# Patient Record
Sex: MALE | Race: WHITE | NOT HISPANIC OR LATINO | Employment: OTHER | URBAN - METROPOLITAN AREA
[De-identification: names, ages, dates, MRNs, and addresses within clinical notes are randomized per-mention and may not be internally consistent; named-entity substitution may affect disease eponyms.]

---

## 2023-12-01 DIAGNOSIS — Z01.818 PREOPERATIVE CLEARANCE: ICD-10-CM

## 2023-12-06 ENCOUNTER — TELEMEDICINE CLINICAL SUPPORT (OUTPATIENT)
Dept: UROLOGY | Facility: CLINIC | Age: 38
End: 2023-12-06
Payer: COMMERCIAL

## 2023-12-06 PROCEDURE — 90791 PSYCH DIAGNOSTIC EVALUATION: CPT | Performed by: PSYCHOLOGIST

## 2023-12-18 DIAGNOSIS — Z01.818 PREOPERATIVE CLEARANCE: ICD-10-CM

## 2023-12-21 DIAGNOSIS — Z41.9 SURGERY, ELECTIVE: ICD-10-CM

## 2023-12-21 RX ORDER — TESTOSTERONE CYPIONATE 200 MG/ML
120 INJECTION, SOLUTION INTRAMUSCULAR
COMMUNITY
Start: 2023-04-27

## 2023-12-21 RX ORDER — CHLORHEXIDINE GLUCONATE 40 MG/ML
SOLUTION TOPICAL SEE ADMIN INSTRUCTIONS
Qty: 118 ML | Refills: 0 | Status: SHIPPED | OUTPATIENT
Start: 2023-12-21 | End: 2023-12-29 | Stop reason: HOSPADM

## 2023-12-21 RX ORDER — METHADONE HYDROCHLORIDE 10 MG/ML
97 CONCENTRATE ORAL DAILY
COMMUNITY

## 2023-12-27 NOTE — PREPROCEDURE INSTRUCTIONS
Current Medications   Medication Instructions    chlorhexidine (Hibiclens) 4 % external liquid Use as directed    methadone (Dolophine) 10 mg/mL solution Continue until night before surgery    testosterone cypionate (Depo-Testosterone) 200 mg/mL injection Use as directed      NPO Instructions:  Additional Instructions:     Enter through main entrance of [facility] hospital building, located at [address] [facility]. Proceed to registration, located in the back right hand corner. You will need your ID and insurance card for registration. Please ensure you have a responsible adult to drive you home.     Shower the morning of or night before your procedure. After you shower avoid lotions, powders, deodorants or anything applied to the skin. If you wear contacts or glasses, wear the glasses. If you do not have glasses, please bring a case for your contacts. You may wear hearing aids and dentures, bring a case for them or we will provide one. Make sure you wear something loose and comfortable. Keep in mind your surgery type and wear something that will accommodate incisions or bandages. Please remove all jewelry.     Nothing to eat or drink after midnight (including coffee, tea, gum etc). You may take medications discussed during phone call with a small sip of water.    For further questions Demarco EL can be contacted at [phone] between 7AM-3PM.

## 2023-12-28 ENCOUNTER — HOSPITAL ENCOUNTER (INPATIENT)
Facility: HOSPITAL | Age: 38
LOS: 1 days | Discharge: HOME | DRG: 876 | End: 2023-12-29
Attending: UROLOGY | Admitting: UROLOGY
Payer: COMMERCIAL

## 2023-12-28 ENCOUNTER — ANESTHESIA (OUTPATIENT)
Dept: OPERATING ROOM | Facility: HOSPITAL | Age: 38
DRG: 876 | End: 2023-12-28
Payer: COMMERCIAL

## 2023-12-28 ENCOUNTER — ANESTHESIA EVENT (OUTPATIENT)
Dept: OPERATING ROOM | Facility: HOSPITAL | Age: 38
DRG: 876 | End: 2023-12-28
Payer: COMMERCIAL

## 2023-12-28 DIAGNOSIS — F64.9 GENDER DYSPHORIA: Primary | ICD-10-CM

## 2023-12-28 PROBLEM — F11.90 OPIOID USE DISORDER: Status: ACTIVE | Noted: 2023-12-28

## 2023-12-28 LAB
ABO GROUP (TYPE) IN BLOOD: NORMAL
ABO GROUP (TYPE) IN BLOOD: NORMAL
ANION GAP SERPL CALC-SCNC: 12 MMOL/L (ref 10–20)
ANTIBODY SCREEN: NORMAL
BUN SERPL-MCNC: 7 MG/DL (ref 6–23)
CALCIUM SERPL-MCNC: 9.2 MG/DL (ref 8.6–10.3)
CHLORIDE SERPL-SCNC: 101 MMOL/L (ref 98–107)
CO2 SERPL-SCNC: 29 MMOL/L (ref 21–32)
CREAT SERPL-MCNC: 1.05 MG/DL (ref 0.5–1.3)
ERYTHROCYTE [DISTWIDTH] IN BLOOD BY AUTOMATED COUNT: 13.1 % (ref 11.5–14.5)
GFR SERPL CREATININE-BSD FRML MDRD: 70 ML/MIN/1.73M*2
GLUCOSE SERPL-MCNC: 67 MG/DL (ref 74–99)
HCT VFR BLD AUTO: 48.2 % (ref 36–52)
HGB BLD-MCNC: 16.4 G/DL (ref 12–17.5)
MCH RBC QN AUTO: 30.4 PG (ref 26–34)
MCHC RBC AUTO-ENTMCNC: 34 G/DL (ref 32–36)
MCV RBC AUTO: 89 FL (ref 80–100)
NRBC BLD-RTO: 0 /100 WBCS (ref 0–0)
PLATELET # BLD AUTO: 220 X10*3/UL (ref 150–450)
POTASSIUM SERPL-SCNC: 3.6 MMOL/L (ref 3.5–5.3)
RBC # BLD AUTO: 5.39 X10*6/UL (ref 4–5.9)
RH FACTOR (ANTIGEN D): NORMAL
RH FACTOR (ANTIGEN D): NORMAL
SODIUM SERPL-SCNC: 138 MMOL/L (ref 136–145)
WBC # BLD AUTO: 10.1 X10*3/UL (ref 4.4–11.3)

## 2023-12-28 PROCEDURE — 2500000004 HC RX 250 GENERAL PHARMACY W/ HCPCS (ALT 636 FOR OP/ED): Performed by: NURSE PRACTITIONER

## 2023-12-28 PROCEDURE — A53410 PR RECONSTRUC ANT MALE URETHRA

## 2023-12-28 PROCEDURE — 53410 RECONSTRUCTION OF URETHRA: CPT | Performed by: UROLOGY

## 2023-12-28 PROCEDURE — 2500000002 HC RX 250 W HCPCS SELF ADMINISTERED DRUGS (ALT 637 FOR MEDICARE OP, ALT 636 FOR OP/ED): Performed by: STUDENT IN AN ORGANIZED HEALTH CARE EDUCATION/TRAINING PROGRAM

## 2023-12-28 PROCEDURE — 3600000003 HC OR TIME - INITIAL BASE CHARGE - PROCEDURE LEVEL THREE: Performed by: UROLOGY

## 2023-12-28 PROCEDURE — 2500000004 HC RX 250 GENERAL PHARMACY W/ HCPCS (ALT 636 FOR OP/ED): Performed by: STUDENT IN AN ORGANIZED HEALTH CARE EDUCATION/TRAINING PROGRAM

## 2023-12-28 PROCEDURE — 15734 MUSCLE-SKIN GRAFT TRUNK: CPT | Performed by: UROLOGY

## 2023-12-28 PROCEDURE — 2500000005 HC RX 250 GENERAL PHARMACY W/O HCPCS: Performed by: ANESTHESIOLOGIST ASSISTANT

## 2023-12-28 PROCEDURE — 2720000007 HC OR 272 NO HCPCS: Performed by: UROLOGY

## 2023-12-28 PROCEDURE — A53410 PR RECONSTRUC ANT MALE URETHRA: Performed by: ANESTHESIOLOGY

## 2023-12-28 PROCEDURE — 0W4N071 CREATION OF PENIS IN FEMALE PERINEUM WITH AUTOLOGOUS TISSUE SUBSTITUTE, OPEN APPROACH: ICD-10-PCS | Performed by: UROLOGY

## 2023-12-28 PROCEDURE — 3700000001 HC GENERAL ANESTHESIA TIME - INITIAL BASE CHARGE: Performed by: UROLOGY

## 2023-12-28 PROCEDURE — S0109 METHADONE ORAL 5MG: HCPCS | Performed by: STUDENT IN AN ORGANIZED HEALTH CARE EDUCATION/TRAINING PROGRAM

## 2023-12-28 PROCEDURE — 7100000001 HC RECOVERY ROOM TIME - INITIAL BASE CHARGE: Performed by: UROLOGY

## 2023-12-28 PROCEDURE — 7100000002 HC RECOVERY ROOM TIME - EACH INCREMENTAL 1 MINUTE: Performed by: UROLOGY

## 2023-12-28 PROCEDURE — 15740 ISLAND PEDICLE FLAP GRAFT: CPT | Performed by: UROLOGY

## 2023-12-28 PROCEDURE — C1894 INTRO/SHEATH, NON-LASER: HCPCS | Performed by: UROLOGY

## 2023-12-28 PROCEDURE — 14301 TIS TRNFR ANY 30.1-60 SQ CM: CPT | Performed by: UROLOGY

## 2023-12-28 PROCEDURE — 2500000004 HC RX 250 GENERAL PHARMACY W/ HCPCS (ALT 636 FOR OP/ED)

## 2023-12-28 PROCEDURE — 2500000005 HC RX 250 GENERAL PHARMACY W/O HCPCS

## 2023-12-28 PROCEDURE — 2500000004 HC RX 250 GENERAL PHARMACY W/ HCPCS (ALT 636 FOR OP/ED): Performed by: ANESTHESIOLOGY

## 2023-12-28 PROCEDURE — 2500000001 HC RX 250 WO HCPCS SELF ADMINISTERED DRUGS (ALT 637 FOR MEDICARE OP): Performed by: STUDENT IN AN ORGANIZED HEALTH CARE EDUCATION/TRAINING PROGRAM

## 2023-12-28 PROCEDURE — 14302 TIS TRNFR ADDL 30 SQ CM: CPT | Performed by: UROLOGY

## 2023-12-28 PROCEDURE — 96372 THER/PROPH/DIAG INJ SC/IM: CPT | Performed by: UROLOGY

## 2023-12-28 PROCEDURE — 85027 COMPLETE CBC AUTOMATED: CPT | Performed by: NURSE PRACTITIONER

## 2023-12-28 PROCEDURE — 3700000002 HC GENERAL ANESTHESIA TIME - EACH INCREMENTAL 1 MINUTE: Performed by: UROLOGY

## 2023-12-28 PROCEDURE — 86901 BLOOD TYPING SEROLOGIC RH(D): CPT | Performed by: UROLOGY

## 2023-12-28 PROCEDURE — 57110 VAGNC COMPL RMVL VAG WALL: CPT | Performed by: UROLOGY

## 2023-12-28 PROCEDURE — 96372 THER/PROPH/DIAG INJ SC/IM: CPT | Performed by: STUDENT IN AN ORGANIZED HEALTH CARE EDUCATION/TRAINING PROGRAM

## 2023-12-28 PROCEDURE — 2500000004 HC RX 250 GENERAL PHARMACY W/ HCPCS (ALT 636 FOR OP/ED): Performed by: ANESTHESIOLOGIST ASSISTANT

## 2023-12-28 PROCEDURE — 55180 REVISION OF SCROTUM: CPT | Performed by: UROLOGY

## 2023-12-28 PROCEDURE — 2500000005 HC RX 250 GENERAL PHARMACY W/O HCPCS: Performed by: UROLOGY

## 2023-12-28 PROCEDURE — 96372 THER/PROPH/DIAG INJ SC/IM: CPT | Performed by: NURSE PRACTITIONER

## 2023-12-28 PROCEDURE — 3600000008 HC OR TIME - EACH INCREMENTAL 1 MINUTE - PROCEDURE LEVEL THREE: Performed by: UROLOGY

## 2023-12-28 PROCEDURE — 80048 BASIC METABOLIC PNL TOTAL CA: CPT | Performed by: NURSE PRACTITIONER

## 2023-12-28 PROCEDURE — 36415 COLL VENOUS BLD VENIPUNCTURE: CPT | Performed by: NURSE PRACTITIONER

## 2023-12-28 PROCEDURE — 1100000001 HC PRIVATE ROOM DAILY

## 2023-12-28 RX ORDER — MIDAZOLAM HYDROCHLORIDE 1 MG/ML
INJECTION, SOLUTION INTRAMUSCULAR; INTRAVENOUS AS NEEDED
Status: DISCONTINUED | OUTPATIENT
Start: 2023-12-28 | End: 2023-12-28

## 2023-12-28 RX ORDER — MIDAZOLAM HYDROCHLORIDE 1 MG/ML
1 INJECTION, SOLUTION INTRAMUSCULAR; INTRAVENOUS ONCE AS NEEDED
Status: DISCONTINUED | OUTPATIENT
Start: 2023-12-28 | End: 2023-12-28 | Stop reason: HOSPADM

## 2023-12-28 RX ORDER — SODIUM CHLORIDE, SODIUM LACTATE, POTASSIUM CHLORIDE, CALCIUM CHLORIDE 600; 310; 30; 20 MG/100ML; MG/100ML; MG/100ML; MG/100ML
100 INJECTION, SOLUTION INTRAVENOUS CONTINUOUS
Status: DISCONTINUED | OUTPATIENT
Start: 2023-12-28 | End: 2023-12-28

## 2023-12-28 RX ORDER — CEFAZOLIN SODIUM 2 G/100ML
INJECTION, SOLUTION INTRAVENOUS AS NEEDED
Status: DISCONTINUED | OUTPATIENT
Start: 2023-12-28 | End: 2023-12-28

## 2023-12-28 RX ORDER — DIPHENHYDRAMINE HYDROCHLORIDE 50 MG/ML
25 INJECTION INTRAMUSCULAR; INTRAVENOUS ONCE AS NEEDED
Status: DISCONTINUED | OUTPATIENT
Start: 2023-12-28 | End: 2023-12-28 | Stop reason: HOSPADM

## 2023-12-28 RX ORDER — FENTANYL CITRATE 50 UG/ML
INJECTION, SOLUTION INTRAMUSCULAR; INTRAVENOUS AS NEEDED
Status: DISCONTINUED | OUTPATIENT
Start: 2023-12-28 | End: 2023-12-28

## 2023-12-28 RX ORDER — OXYBUTYNIN CHLORIDE 5 MG/1
5 TABLET ORAL DAILY PRN
Status: DISCONTINUED | OUTPATIENT
Start: 2023-12-28 | End: 2023-12-29 | Stop reason: HOSPADM

## 2023-12-28 RX ORDER — CEFAZOLIN SODIUM 2 G/100ML
2 INJECTION, SOLUTION INTRAVENOUS ONCE
Status: DISCONTINUED | OUTPATIENT
Start: 2023-12-28 | End: 2023-12-28

## 2023-12-28 RX ORDER — METHADONE HYDROCHLORIDE 5 MG/5ML
96 SOLUTION ORAL DAILY
Status: DISCONTINUED | OUTPATIENT
Start: 2023-12-28 | End: 2023-12-29 | Stop reason: HOSPADM

## 2023-12-28 RX ORDER — HEPARIN SODIUM 5000 [USP'U]/ML
5000 INJECTION, SOLUTION INTRAVENOUS; SUBCUTANEOUS ONCE
Status: COMPLETED | OUTPATIENT
Start: 2023-12-28 | End: 2023-12-28

## 2023-12-28 RX ORDER — SODIUM CHLORIDE 9 MG/ML
100 INJECTION, SOLUTION INTRAVENOUS CONTINUOUS
Status: DISCONTINUED | OUTPATIENT
Start: 2023-12-28 | End: 2023-12-29 | Stop reason: HOSPADM

## 2023-12-28 RX ORDER — ALBUTEROL SULFATE 0.83 MG/ML
2.5 SOLUTION RESPIRATORY (INHALATION) ONCE AS NEEDED
Status: DISCONTINUED | OUTPATIENT
Start: 2023-12-28 | End: 2023-12-28 | Stop reason: HOSPADM

## 2023-12-28 RX ORDER — ONDANSETRON 4 MG/1
4 TABLET, FILM COATED ORAL EVERY 8 HOURS PRN
Status: DISCONTINUED | OUTPATIENT
Start: 2023-12-28 | End: 2023-12-29 | Stop reason: HOSPADM

## 2023-12-28 RX ORDER — GABAPENTIN 300 MG/1
600 CAPSULE ORAL ONCE
Status: DISCONTINUED | OUTPATIENT
Start: 2023-12-28 | End: 2023-12-28 | Stop reason: HOSPADM

## 2023-12-28 RX ORDER — HYDROMORPHONE HYDROCHLORIDE 1 MG/ML
1 INJECTION, SOLUTION INTRAMUSCULAR; INTRAVENOUS; SUBCUTANEOUS EVERY 5 MIN PRN
Status: DISCONTINUED | OUTPATIENT
Start: 2023-12-28 | End: 2023-12-28 | Stop reason: HOSPADM

## 2023-12-28 RX ORDER — MEPERIDINE HYDROCHLORIDE 25 MG/ML
12.5 INJECTION INTRAMUSCULAR; INTRAVENOUS; SUBCUTANEOUS EVERY 10 MIN PRN
Status: DISCONTINUED | OUTPATIENT
Start: 2023-12-28 | End: 2023-12-28 | Stop reason: HOSPADM

## 2023-12-28 RX ORDER — NORETHINDRONE AND ETHINYL ESTRADIOL 0.5-0.035
KIT ORAL AS NEEDED
Status: DISCONTINUED | OUTPATIENT
Start: 2023-12-28 | End: 2023-12-28

## 2023-12-28 RX ORDER — ONDANSETRON HYDROCHLORIDE 2 MG/ML
4 INJECTION, SOLUTION INTRAVENOUS ONCE AS NEEDED
Status: DISCONTINUED | OUTPATIENT
Start: 2023-12-28 | End: 2023-12-28 | Stop reason: HOSPADM

## 2023-12-28 RX ORDER — PHENYLEPHRINE HCL IN 0.9% NACL 1 MG/10 ML
SYRINGE (ML) INTRAVENOUS AS NEEDED
Status: DISCONTINUED | OUTPATIENT
Start: 2023-12-28 | End: 2023-12-28

## 2023-12-28 RX ORDER — KETOROLAC TROMETHAMINE 30 MG/ML
30 INJECTION, SOLUTION INTRAMUSCULAR; INTRAVENOUS EVERY 8 HOURS SCHEDULED
Status: DISCONTINUED | OUTPATIENT
Start: 2023-12-28 | End: 2023-12-29 | Stop reason: HOSPADM

## 2023-12-28 RX ORDER — ACETAMINOPHEN 325 MG/1
975 TABLET ORAL EVERY 6 HOURS
Status: DISCONTINUED | OUTPATIENT
Start: 2023-12-28 | End: 2023-12-29 | Stop reason: HOSPADM

## 2023-12-28 RX ORDER — HEPARIN SODIUM 5000 [USP'U]/ML
7500 INJECTION, SOLUTION INTRAVENOUS; SUBCUTANEOUS EVERY 8 HOURS SCHEDULED
Status: DISCONTINUED | OUTPATIENT
Start: 2023-12-28 | End: 2023-12-29 | Stop reason: HOSPADM

## 2023-12-28 RX ORDER — LABETALOL HYDROCHLORIDE 5 MG/ML
5 INJECTION, SOLUTION INTRAVENOUS ONCE AS NEEDED
Status: DISCONTINUED | OUTPATIENT
Start: 2023-12-28 | End: 2023-12-28 | Stop reason: HOSPADM

## 2023-12-28 RX ORDER — POLYETHYLENE GLYCOL 3350 17 G/17G
17 POWDER, FOR SOLUTION ORAL DAILY
Status: DISCONTINUED | OUTPATIENT
Start: 2023-12-28 | End: 2023-12-29 | Stop reason: HOSPADM

## 2023-12-28 RX ORDER — LIDOCAINE HYDROCHLORIDE AND EPINEPHRINE 10; 10 MG/ML; UG/ML
INJECTION, SOLUTION INFILTRATION; PERINEURAL AS NEEDED
Status: DISCONTINUED | OUTPATIENT
Start: 2023-12-28 | End: 2023-12-28 | Stop reason: HOSPADM

## 2023-12-28 RX ORDER — FAMOTIDINE 10 MG/ML
20 INJECTION INTRAVENOUS ONCE
Status: DISCONTINUED | OUTPATIENT
Start: 2023-12-28 | End: 2023-12-28 | Stop reason: HOSPADM

## 2023-12-28 RX ORDER — DEXMEDETOMIDINE HYDROCHLORIDE 4 UG/ML
INJECTION, SOLUTION INTRAVENOUS CONTINUOUS PRN
Status: DISCONTINUED | OUTPATIENT
Start: 2023-12-28 | End: 2023-12-28

## 2023-12-28 RX ORDER — DOCUSATE SODIUM 100 MG/1
100 CAPSULE, LIQUID FILLED ORAL 2 TIMES DAILY
Status: DISCONTINUED | OUTPATIENT
Start: 2023-12-28 | End: 2023-12-29 | Stop reason: HOSPADM

## 2023-12-28 RX ORDER — EPINEPHRINE 1 MG/ML
INJECTION INTRAMUSCULAR; INTRAVENOUS; SUBCUTANEOUS AS NEEDED
Status: DISCONTINUED | OUTPATIENT
Start: 2023-12-28 | End: 2023-12-28 | Stop reason: HOSPADM

## 2023-12-28 RX ORDER — OXYCODONE HYDROCHLORIDE 5 MG/1
5 TABLET ORAL EVERY 6 HOURS PRN
Status: DISCONTINUED | OUTPATIENT
Start: 2023-12-28 | End: 2023-12-29 | Stop reason: HOSPADM

## 2023-12-28 RX ORDER — PROPOFOL 10 MG/ML
INJECTION, EMULSION INTRAVENOUS AS NEEDED
Status: DISCONTINUED | OUTPATIENT
Start: 2023-12-28 | End: 2023-12-28

## 2023-12-28 RX ORDER — SODIUM CHLORIDE, SODIUM LACTATE, POTASSIUM CHLORIDE, CALCIUM CHLORIDE 600; 310; 30; 20 MG/100ML; MG/100ML; MG/100ML; MG/100ML
100 INJECTION, SOLUTION INTRAVENOUS CONTINUOUS
Status: DISCONTINUED | OUTPATIENT
Start: 2023-12-28 | End: 2023-12-28 | Stop reason: HOSPADM

## 2023-12-28 RX ORDER — ACETAMINOPHEN 325 MG/1
975 TABLET ORAL ONCE
Status: DISCONTINUED | OUTPATIENT
Start: 2023-12-28 | End: 2023-12-28 | Stop reason: HOSPADM

## 2023-12-28 RX ORDER — ROCURONIUM BROMIDE 10 MG/ML
INJECTION, SOLUTION INTRAVENOUS AS NEEDED
Status: DISCONTINUED | OUTPATIENT
Start: 2023-12-28 | End: 2023-12-28

## 2023-12-28 RX ORDER — ONDANSETRON HYDROCHLORIDE 2 MG/ML
INJECTION, SOLUTION INTRAVENOUS AS NEEDED
Status: DISCONTINUED | OUTPATIENT
Start: 2023-12-28 | End: 2023-12-28

## 2023-12-28 RX ORDER — SCOLOPAMINE TRANSDERMAL SYSTEM 1 MG/1
1 PATCH, EXTENDED RELEASE TRANSDERMAL ONCE
Status: DISCONTINUED | OUTPATIENT
Start: 2023-12-28 | End: 2023-12-28 | Stop reason: HOSPADM

## 2023-12-28 RX ORDER — MORPHINE SULFATE 2 MG/ML
2 INJECTION, SOLUTION INTRAMUSCULAR; INTRAVENOUS EVERY 5 MIN PRN
Status: DISCONTINUED | OUTPATIENT
Start: 2023-12-28 | End: 2023-12-28 | Stop reason: HOSPADM

## 2023-12-28 RX ORDER — HYDRALAZINE HYDROCHLORIDE 20 MG/ML
5 INJECTION INTRAMUSCULAR; INTRAVENOUS EVERY 30 MIN PRN
Status: DISCONTINUED | OUTPATIENT
Start: 2023-12-28 | End: 2023-12-28 | Stop reason: HOSPADM

## 2023-12-28 RX ORDER — METOPROLOL TARTRATE 1 MG/ML
5 INJECTION, SOLUTION INTRAVENOUS ONCE
Status: DISCONTINUED | OUTPATIENT
Start: 2023-12-28 | End: 2023-12-28 | Stop reason: HOSPADM

## 2023-12-28 RX ORDER — DEXAMETHASONE SODIUM PHOSPHATE 4 MG/ML
INJECTION, SOLUTION INTRA-ARTICULAR; INTRALESIONAL; INTRAMUSCULAR; INTRAVENOUS; SOFT TISSUE AS NEEDED
Status: DISCONTINUED | OUTPATIENT
Start: 2023-12-28 | End: 2023-12-28

## 2023-12-28 RX ORDER — CELECOXIB 100 MG/1
400 CAPSULE ORAL ONCE
Status: DISCONTINUED | OUTPATIENT
Start: 2023-12-28 | End: 2023-12-28 | Stop reason: HOSPADM

## 2023-12-28 RX ORDER — ONDANSETRON HYDROCHLORIDE 2 MG/ML
4 INJECTION, SOLUTION INTRAVENOUS EVERY 8 HOURS PRN
Status: DISCONTINUED | OUTPATIENT
Start: 2023-12-28 | End: 2023-12-29 | Stop reason: HOSPADM

## 2023-12-28 RX ORDER — LIDOCAINE HCL/PF 100 MG/5ML
SYRINGE (ML) INTRAVENOUS AS NEEDED
Status: DISCONTINUED | OUTPATIENT
Start: 2023-12-28 | End: 2023-12-28

## 2023-12-28 RX ORDER — BUPIVACAINE HYDROCHLORIDE 5 MG/ML
INJECTION, SOLUTION PERINEURAL AS NEEDED
Status: DISCONTINUED | OUTPATIENT
Start: 2023-12-28 | End: 2023-12-28 | Stop reason: HOSPADM

## 2023-12-28 RX ADMIN — EPHEDRINE SULFATE 5 MG: 50 INJECTION, SOLUTION INTRAVENOUS at 13:04

## 2023-12-28 RX ADMIN — FENTANYL CITRATE 100 MCG: 50 INJECTION, SOLUTION INTRAMUSCULAR; INTRAVENOUS at 11:19

## 2023-12-28 RX ADMIN — OXYBUTYNIN CHLORIDE 5 MG: 5 TABLET ORAL at 22:01

## 2023-12-28 RX ADMIN — KETOROLAC TROMETHAMINE 30 MG: 30 INJECTION, SOLUTION INTRAMUSCULAR at 18:05

## 2023-12-28 RX ADMIN — ROCURONIUM BROMIDE 30 MG: 10 INJECTION, SOLUTION INTRAVENOUS at 12:05

## 2023-12-28 RX ADMIN — Medication 200 MCG: at 12:57

## 2023-12-28 RX ADMIN — SODIUM CHLORIDE, POTASSIUM CHLORIDE, SODIUM LACTATE AND CALCIUM CHLORIDE: 600; 310; 30; 20 INJECTION, SOLUTION INTRAVENOUS at 11:30

## 2023-12-28 RX ADMIN — SODIUM CHLORIDE, POTASSIUM CHLORIDE, SODIUM LACTATE AND CALCIUM CHLORIDE 100 ML/HR: 600; 310; 30; 20 INJECTION, SOLUTION INTRAVENOUS at 08:44

## 2023-12-28 RX ADMIN — SODIUM CHLORIDE 100 ML/HR: 9 INJECTION, SOLUTION INTRAVENOUS at 19:38

## 2023-12-28 RX ADMIN — ROCURONIUM BROMIDE 20 MG: 10 INJECTION, SOLUTION INTRAVENOUS at 13:25

## 2023-12-28 RX ADMIN — METHADONE HYDROCHLORIDE 96 MG: 5 SOLUTION ORAL at 19:37

## 2023-12-28 RX ADMIN — MIDAZOLAM 2 MG: 1 INJECTION INTRAMUSCULAR; INTRAVENOUS at 11:17

## 2023-12-28 RX ADMIN — DEXAMETHASONE SODIUM PHOSPHATE 4 MG: 4 INJECTION, SOLUTION INTRAMUSCULAR; INTRAVENOUS at 11:45

## 2023-12-28 RX ADMIN — MEPERIDINE HYDROCHLORIDE 12.5 MG: 25 INJECTION INTRAMUSCULAR; INTRAVENOUS; SUBCUTANEOUS at 16:31

## 2023-12-28 RX ADMIN — MIDAZOLAM 2 MG: 1 INJECTION INTRAMUSCULAR; INTRAVENOUS at 11:19

## 2023-12-28 RX ADMIN — POLYETHYLENE GLYCOL 3350 17 G: 17 POWDER, FOR SOLUTION ORAL at 22:00

## 2023-12-28 RX ADMIN — GENTAMICIN SULFATE 350 MG: 40 INJECTION, SOLUTION INTRAMUSCULAR; INTRAVENOUS at 11:44

## 2023-12-28 RX ADMIN — ROCURONIUM BROMIDE 50 MG: 10 INJECTION, SOLUTION INTRAVENOUS at 11:24

## 2023-12-28 RX ADMIN — PROPOFOL 200 MG: 10 INJECTION, EMULSION INTRAVENOUS at 11:23

## 2023-12-28 RX ADMIN — LIDOCAINE HYDROCHLORIDE 100 MG: 20 INJECTION INTRAVENOUS at 11:23

## 2023-12-28 RX ADMIN — SUGAMMADEX 200 MG: 100 INJECTION, SOLUTION INTRAVENOUS at 15:12

## 2023-12-28 RX ADMIN — DEXMEDETOMIDINE HYDROCHLORIDE 0.4 MCG/KG/HR: 400 INJECTION INTRAVENOUS at 12:00

## 2023-12-28 RX ADMIN — HEPARIN SODIUM 5000 UNITS: 5000 INJECTION INTRAVENOUS; SUBCUTANEOUS at 08:26

## 2023-12-28 RX ADMIN — SODIUM CHLORIDE 100 ML/HR: 9 INJECTION, SOLUTION INTRAVENOUS at 17:45

## 2023-12-28 RX ADMIN — HYDROMORPHONE HYDROCHLORIDE 1 MG: 1 INJECTION, SOLUTION INTRAMUSCULAR; INTRAVENOUS; SUBCUTANEOUS at 16:30

## 2023-12-28 RX ADMIN — HYDROMORPHONE HYDROCHLORIDE 1 MG: 1 INJECTION, SOLUTION INTRAMUSCULAR; INTRAVENOUS; SUBCUTANEOUS at 15:57

## 2023-12-28 RX ADMIN — DOCUSATE SODIUM 100 MG: 100 CAPSULE, LIQUID FILLED ORAL at 21:59

## 2023-12-28 RX ADMIN — HEPARIN SODIUM 7500 UNITS: 5000 INJECTION INTRAVENOUS; SUBCUTANEOUS at 21:59

## 2023-12-28 RX ADMIN — ONDANSETRON 4 MG: 2 INJECTION INTRAMUSCULAR; INTRAVENOUS at 15:17

## 2023-12-28 RX ADMIN — MEPERIDINE HYDROCHLORIDE 12.5 MG: 25 INJECTION INTRAMUSCULAR; INTRAVENOUS; SUBCUTANEOUS at 16:18

## 2023-12-28 RX ADMIN — HYDROMORPHONE HYDROCHLORIDE 1 MG: 2 INJECTION, SOLUTION INTRAMUSCULAR; INTRAVENOUS; SUBCUTANEOUS at 12:11

## 2023-12-28 RX ADMIN — SODIUM CHLORIDE, POTASSIUM CHLORIDE, SODIUM LACTATE AND CALCIUM CHLORIDE: 600; 310; 30; 20 INJECTION, SOLUTION INTRAVENOUS at 13:31

## 2023-12-28 RX ADMIN — CEFAZOLIN SODIUM 2 G: 2 INJECTION, SOLUTION INTRAVENOUS at 11:29

## 2023-12-28 RX ADMIN — CEFAZOLIN SODIUM 2 G: 2 INJECTION, SOLUTION INTRAVENOUS at 15:29

## 2023-12-28 RX ADMIN — SODIUM CHLORIDE, POTASSIUM CHLORIDE, SODIUM LACTATE AND CALCIUM CHLORIDE: 600; 310; 30; 20 INJECTION, SOLUTION INTRAVENOUS at 11:17

## 2023-12-28 RX ADMIN — Medication 100 MCG: at 12:45

## 2023-12-28 RX ADMIN — PROPOFOL 150 MG: 10 INJECTION, EMULSION INTRAVENOUS at 11:25

## 2023-12-28 RX ADMIN — FENTANYL CITRATE 100 MCG: 50 INJECTION, SOLUTION INTRAMUSCULAR; INTRAVENOUS at 11:23

## 2023-12-28 SDOH — HEALTH STABILITY: MENTAL HEALTH: CURRENT SMOKER: 0

## 2023-12-28 ASSESSMENT — PAIN SCALES - GENERAL
PAINLEVEL_OUTOF10: 7
PAINLEVEL_OUTOF10: 0 - NO PAIN
PAINLEVEL_OUTOF10: 0 - NO PAIN
PAINLEVEL_OUTOF10: 7
PAINLEVEL_OUTOF10: 5 - MODERATE PAIN
PAINLEVEL_OUTOF10: 6
PAINLEVEL_OUTOF10: 9
PAINLEVEL_OUTOF10: 0 - NO PAIN
PAIN_LEVEL: 0

## 2023-12-28 ASSESSMENT — COGNITIVE AND FUNCTIONAL STATUS - GENERAL
DRESSING REGULAR LOWER BODY CLOTHING: A LITTLE
DAILY ACTIVITIY SCORE: 18
EATING MEALS: A LITTLE
HELP NEEDED FOR BATHING: A LITTLE
MOVING TO AND FROM BED TO CHAIR: A LITTLE
MOVING FROM LYING ON BACK TO SITTING ON SIDE OF FLAT BED WITH BEDRAILS: A LITTLE
CLIMB 3 TO 5 STEPS WITH RAILING: A LITTLE
TOILETING: A LITTLE
TURNING FROM BACK TO SIDE WHILE IN FLAT BAD: A LITTLE
DRESSING REGULAR UPPER BODY CLOTHING: A LITTLE
STANDING UP FROM CHAIR USING ARMS: A LITTLE
MOBILITY SCORE: 18
PERSONAL GROOMING: A LITTLE
WALKING IN HOSPITAL ROOM: A LITTLE

## 2023-12-28 ASSESSMENT — ENCOUNTER SYMPTOMS
EYE DISCHARGE: 0
VOMITING: 0
FATIGUE: 0
CHOKING: 0
NERVOUS/ANXIOUS: 1

## 2023-12-28 ASSESSMENT — COLUMBIA-SUICIDE SEVERITY RATING SCALE - C-SSRS
2. HAVE YOU ACTUALLY HAD ANY THOUGHTS OF KILLING YOURSELF?: NO
1. IN THE PAST MONTH, HAVE YOU WISHED YOU WERE DEAD OR WISHED YOU COULD GO TO SLEEP AND NOT WAKE UP?: NO
6. HAVE YOU EVER DONE ANYTHING, STARTED TO DO ANYTHING, OR PREPARED TO DO ANYTHING TO END YOUR LIFE?: NO

## 2023-12-28 ASSESSMENT — PAIN - FUNCTIONAL ASSESSMENT
PAIN_FUNCTIONAL_ASSESSMENT: 0-10

## 2023-12-28 NOTE — ANESTHESIA PROCEDURE NOTES
Airway  Date/Time: 12/28/2023 11:25 AM  Urgency: elective    Airway not difficult    Staffing  Performed: CRNA   Authorized by: Fredo Wolf MD    Performed by: RADHAMES Garrison-CRNA  Patient location during procedure: OR    Indications and Patient Condition  Indications for airway management: anesthesia  Spontaneous Ventilation: absent  Sedation level: deep  Preoxygenated: yes  Patient position: sniffing  Mask difficulty assessment: 2 - vent by mask + OA or adjuvant +/- NMBA  Planned trial extubation    Final Airway Details  Final airway type: endotracheal airway      Successful airway: ETT     Successful intubation technique: video laryngoscopy (elective Fuentes 4 use)  Facilitating devices/methods: intubating stylet  Endotracheal tube insertion site: oral  Blade type: Fuentes 4.  Blade size: #4  ETT size (mm): 7.5  Cormack-Lehane Classification: grade IIa - partial view of glottis  Placement verified by: chest auscultation, capnometry and palpation of cuff   Measured from: lips  ETT to lips (cm): 21  Number of attempts at approach: 1  Ventilation between attempts: none  Number of other approaches attempted: 0    Additional Comments  Elective Fuentes 4 use. Atraumatic intubation attempt x1.

## 2023-12-28 NOTE — H&P
"History Of Present Illness  Herb Mcdonough is a 38 y.o. adult presenting with gender dysphoria who presents for metoidioplasty.     Past Medical History  Past Medical History:   Diagnosis Date    Anxiety        Surgical History  Past Surgical History:   Procedure Laterality Date    HYSTERECTOMY      OTHER SURGICAL HISTORY  11/30/2022    Neck surgery    OTHER SURGICAL HISTORY  11/30/2022    Breast reduction    OTHER SURGICAL HISTORY  11/30/2022    Hysterectomy    OTHER SURGICAL HISTORY  11/30/2022    Breast surgery    OTHER SURGICAL HISTORY  11/30/2022    Appendectomy    OTHER SURGICAL HISTORY  11/30/2022    Cholecystectomy        Social History  He reports that he has quit smoking. His smoking use included cigarettes. He does not have any smokeless tobacco history on file. He reports that he does not currently use drugs. No history on file for alcohol use.    Family History  No family history on file.     Allergies  Metronidazole; Trazodone; Vancomycin; Bee venom protein (honey bee); Morphine; Prochlorperazine; Sumatriptan; Peanut oil; Haloperidol; Latex, natural rubber; and Metoclopramide    Review of Systems   Constitutional:  Negative for fatigue.   HENT:  Negative for congestion.    Eyes:  Negative for discharge.   Respiratory:  Negative for choking.    Cardiovascular:  Negative for leg swelling.   Gastrointestinal:  Negative for vomiting.   Endocrine: Negative for cold intolerance.   Psychiatric/Behavioral:  The patient is nervous/anxious.         Physical Exam     Physical Exam:   Con: Awake, alert, NAD  HENT: NCAT  Eyes: EOM intact  CV: Normal rate   Pulm: NLB  Mood: normal        Last Recorded Vitals  Blood pressure 139/90, pulse 95, temperature 36.9 °C (98.4 °F), temperature source Temporal, resp. rate 16, height 1.702 m (5' 7\"), weight 118 kg (260 lb 2.3 oz), SpO2 96 %.    Relevant Results         Assessment/Plan   Active Problems:    Opioid use disorder  Herb Mcdonough is a 38 y.o. adult presenting with gender " dysphoria who presents for metoidioplasty.     Proceed to OR        Briseyda Bach MD

## 2023-12-28 NOTE — ANESTHESIA PREPROCEDURE EVALUATION
"Patient: Sofie Mcdonough \"Cam\"    Procedure Information       Date/Time: 12/28/23 0930    Procedure: METOIDOPLASTY WITH URETHRAL LENGTHENING    Location: [facility] 06 / [facility]    Surgeons: Manuel Ramirez MD            Relevant Problems   Neuro/Psych   (+) Opioid use disorder       Clinical information reviewed:    Allergies  Meds               NPO Detail:  NPO/Void Status  Carbonhydrate Drink Given Prior to Surgery? : N  Date of Last Liquid: 12/27/23  Time of Last Liquid: 2230  Date of Last Solid: 12/27/23  Time of Last Solid: 2230         Physical Exam    Airway  Mallampati: II  TM distance: >3 FB  Neck ROM: limited     Cardiovascular - normal exam  Rhythm: regular  Rate: normal     Dental   (+) upper dentures, lower dentures     Pulmonary - normal exam     Abdominal            Anesthesia Plan    ASA 2     general     The patient is not a current smoker.  Education provided regarding risk of obstructive sleep apnea.  intravenous induction   Postoperative administration of opioids is intended.  Trial extubation is planned.  Anesthetic plan and risks discussed with patient.    Plan discussed with CRNA.      "

## 2023-12-28 NOTE — ANESTHESIA POSTPROCEDURE EVALUATION
"Patient: Sofie Mcdonough \"Cam\"    Procedure Summary       Date: 12/28/23 Room / Location: [facility] / [facility]    Anesthesia Start: 1117 Anesthesia Stop: 1540    Procedure: METOIDOPLASTY WITH URETHRAL LENGTHENING Diagnosis:       Gender identity disorder, unspecified      (Gender identity disorder, unspecified [F64.9])    Surgeons: Manuel Ramirez MD Responsible Provider: Fredo Wolf MD    Anesthesia Type: general ASA Status: 2            Anesthesia Type: general    Vitals Value Taken Time   /77 12/28/23 1530   Temp 36.8 °C (98.2 °F) 12/28/23 1530   Pulse 98 12/28/23 1538   Resp 16 12/28/23 1530   SpO2 97 % 12/28/23 1538   Vitals shown include unvalidated device data.    Anesthesia Post Evaluation    Patient location during evaluation: PACU  Patient participation: complete - patient participated  Level of consciousness: awake and alert  Pain score: 0  Pain management: adequate  Airway patency: patent  Cardiovascular status: acceptable  Respiratory status: acceptable  Hydration status: acceptable  Postoperative Nausea and Vomiting: none        There were no known notable events for this encounter.    "

## 2023-12-28 NOTE — BRIEF OP NOTE
"Date: 2023  OR Location: [facility] OR    Name: Sofie Mcdonough \"Herb\", : 1985, Age: 38 y.o., MRN: [id number], Sex: adult    Diagnosis  Pre-op Diagnosis     * Gender identity disorder, unspecified [F64.9] Post-op Diagnosis     * Gender identity disorder, unspecified [F64.9]     Procedures    * METOIDOPLASTY WITH URETHRAL LENGTHENING    Surgeons      * Manuel Ramirez - Primary    Resident/Fellow/Other Assistant:  Surgeon(s) and Role:    Procedure Summary  Anesthesia: General  ASA: II  Anesthesia Staff: Anesthesiologist: Fredo Wolf MD; Beni Ramirez MD  CRNA: Elsi Will APRN-CRNA; RADHAMES Bae-CRNA  C-AA: TIMA Fall  Estimated Blood Loss: 150mL  Intra-op Medications:   Medication Name Total Dose   EPINEPHrine (Adrenalin) injection 30 mg   gentamicin (Garamycin) 350 mg in dextrose 5 % in water (D5W) 100 mL  mg              Anesthesia Record               Intraprocedure I/O Totals          Intake    Dexmedetomidine 0.00 mL    The total shown is the total volume documented since Anesthesia Start was filed.    LR 1200.00 mL    ceFAZolin (Ancef) IVPB 2 g/100 mL D5 (premix) 100.00 mL    gentamicin (Garamycin) 350 mg in dextrose 5 % in water (D5W) 100 mL .00 mL    Total Intake 1400 mL       Output    Urine 500 mL    Total Output 500 mL       Net    Net Volume 900 mL          Specimen: No specimens collected     Staff:   Circulator: Carissa Pickens RN; Lorena Talley RN  Relief Circulator: Mandy Yates RN  Scrub Person: Mandy Yates RN; Priya Witt RN          Findings: expected anatomy     Complications:  None; patient tolerated the procedure well.     Disposition: PACU - hemodynamically stable.  Condition: stable  Specimens Collected: No specimens collected  Attending Attestation:  Dr. Ramirez was present for the entire procedure      Briseyda Bach MD for     Manuel Ramirez  Phone Number: [phone]  "

## 2023-12-29 VITALS
RESPIRATION RATE: 18 BRPM | TEMPERATURE: 97.3 F | SYSTOLIC BLOOD PRESSURE: 97 MMHG | WEIGHT: 260.14 LBS | DIASTOLIC BLOOD PRESSURE: 54 MMHG | HEIGHT: 67 IN | OXYGEN SATURATION: 90 % | HEART RATE: 72 BPM | BODY MASS INDEX: 40.83 KG/M2

## 2023-12-29 LAB
ANION GAP SERPL CALC-SCNC: 11 MMOL/L (ref 10–20)
BUN SERPL-MCNC: 9 MG/DL (ref 6–23)
CALCIUM SERPL-MCNC: 8 MG/DL (ref 8.6–10.3)
CHLORIDE SERPL-SCNC: 102 MMOL/L (ref 98–107)
CO2 SERPL-SCNC: 27 MMOL/L (ref 21–32)
CREAT SERPL-MCNC: 1.04 MG/DL (ref 0.5–1.3)
ERYTHROCYTE [DISTWIDTH] IN BLOOD BY AUTOMATED COUNT: 13.1 % (ref 11.5–14.5)
GFR SERPL CREATININE-BSD FRML MDRD: 71 ML/MIN/1.73M*2
GLUCOSE SERPL-MCNC: 94 MG/DL (ref 74–99)
HCT VFR BLD AUTO: 40.2 % (ref 36–52)
HGB BLD-MCNC: 13.3 G/DL (ref 12–17.5)
MCH RBC QN AUTO: 30.2 PG (ref 26–34)
MCHC RBC AUTO-ENTMCNC: 33.1 G/DL (ref 32–36)
MCV RBC AUTO: 91 FL (ref 80–100)
NRBC BLD-RTO: 0 /100 WBCS (ref 0–0)
PLATELET # BLD AUTO: 182 X10*3/UL (ref 150–450)
POTASSIUM SERPL-SCNC: 3.7 MMOL/L (ref 3.5–5.3)
RBC # BLD AUTO: 4.4 X10*6/UL (ref 4–5.9)
SODIUM SERPL-SCNC: 136 MMOL/L (ref 136–145)
WBC # BLD AUTO: 14.3 X10*3/UL (ref 4.4–11.3)

## 2023-12-29 PROCEDURE — 99221 1ST HOSP IP/OBS SF/LOW 40: CPT | Performed by: NURSE PRACTITIONER

## 2023-12-29 PROCEDURE — 85027 COMPLETE CBC AUTOMATED: CPT | Performed by: STUDENT IN AN ORGANIZED HEALTH CARE EDUCATION/TRAINING PROGRAM

## 2023-12-29 PROCEDURE — 36415 COLL VENOUS BLD VENIPUNCTURE: CPT | Performed by: STUDENT IN AN ORGANIZED HEALTH CARE EDUCATION/TRAINING PROGRAM

## 2023-12-29 PROCEDURE — 80048 BASIC METABOLIC PNL TOTAL CA: CPT | Performed by: STUDENT IN AN ORGANIZED HEALTH CARE EDUCATION/TRAINING PROGRAM

## 2023-12-29 PROCEDURE — 96372 THER/PROPH/DIAG INJ SC/IM: CPT | Performed by: STUDENT IN AN ORGANIZED HEALTH CARE EDUCATION/TRAINING PROGRAM

## 2023-12-29 PROCEDURE — 2500000001 HC RX 250 WO HCPCS SELF ADMINISTERED DRUGS (ALT 637 FOR MEDICARE OP): Performed by: STUDENT IN AN ORGANIZED HEALTH CARE EDUCATION/TRAINING PROGRAM

## 2023-12-29 PROCEDURE — 2500000004 HC RX 250 GENERAL PHARMACY W/ HCPCS (ALT 636 FOR OP/ED): Performed by: STUDENT IN AN ORGANIZED HEALTH CARE EDUCATION/TRAINING PROGRAM

## 2023-12-29 PROCEDURE — 99231 SBSQ HOSP IP/OBS SF/LOW 25: CPT | Performed by: NURSE PRACTITIONER

## 2023-12-29 RX ORDER — OXYCODONE AND ACETAMINOPHEN 5; 325 MG/1; MG/1
1 TABLET ORAL EVERY 6 HOURS PRN
Qty: 5 TABLET | Refills: 0 | Status: SHIPPED | OUTPATIENT
Start: 2023-12-29 | End: 2024-05-27 | Stop reason: ALTCHOICE

## 2023-12-29 RX ADMIN — KETOROLAC TROMETHAMINE 30 MG: 30 INJECTION, SOLUTION INTRAMUSCULAR at 02:24

## 2023-12-29 RX ADMIN — DEXTROSE MONOHYDRATE 3 G: 5 INJECTION INTRAVENOUS at 00:05

## 2023-12-29 RX ADMIN — ACETAMINOPHEN 975 MG: 325 TABLET ORAL at 00:05

## 2023-12-29 RX ADMIN — DOCUSATE SODIUM 100 MG: 100 CAPSULE, LIQUID FILLED ORAL at 09:33

## 2023-12-29 RX ADMIN — OXYCODONE HYDROCHLORIDE 5 MG: 5 TABLET ORAL at 00:35

## 2023-12-29 RX ADMIN — KETOROLAC TROMETHAMINE 30 MG: 30 INJECTION, SOLUTION INTRAMUSCULAR at 09:34

## 2023-12-29 RX ADMIN — HEPARIN SODIUM 7500 UNITS: 5000 INJECTION INTRAVENOUS; SUBCUTANEOUS at 06:03

## 2023-12-29 RX ADMIN — ACETAMINOPHEN 975 MG: 325 TABLET ORAL at 06:03

## 2023-12-29 RX ADMIN — DEXTROSE MONOHYDRATE 3 G: 5 INJECTION INTRAVENOUS at 09:34

## 2023-12-29 ASSESSMENT — PAIN SCALES - GENERAL
PAINLEVEL_OUTOF10: 5 - MODERATE PAIN
PAINLEVEL_OUTOF10: 4
PAINLEVEL_OUTOF10: 7

## 2023-12-29 ASSESSMENT — PAIN - FUNCTIONAL ASSESSMENT: PAIN_FUNCTIONAL_ASSESSMENT: 0-10

## 2023-12-29 ASSESSMENT — PAIN DESCRIPTION - LOCATION: LOCATION: ABDOMEN

## 2023-12-29 NOTE — CARE PLAN
The patient's goals for the shift include      The clinical goals for the shift include Pain control      Problem: Pain  Goal: Takes deep breaths with improved pain control throughout the shift  Outcome: Progressing     Problem: Pain  Goal: Turns in bed with improved pain control throughout the shift  Outcome: Progressing     Problem: Pain  Goal: Walks with improved pain control throughout the shift  Outcome: Progressing

## 2023-12-29 NOTE — CARE PLAN
Problem: Pain  Goal: Takes deep breaths with improved pain control throughout the shift  Outcome: Progressing  Goal: Turns in bed with improved pain control throughout the shift  Outcome: Progressing  Goal: Walks with improved pain control throughout the shift  Outcome: Progressing  Goal: Performs ADL's with improved pain control throughout shift  Outcome: Progressing  Goal: Participates in PT with improved pain control throughout the shift  Outcome: Progressing  Goal: Free from opioid side effects throughout the shift  Outcome: Progressing  Goal: Free from acute confusion related to pain meds throughout the shift  Outcome: Progressing   The patient's goals for the shift include      The clinical goals for the shift include pain control

## 2023-12-29 NOTE — CONSULTS
"Inpatient consult to Medicine  Consult performed by: ASHANTI Yeager  Consult ordered by: ASHANTI Lance  Reason for consult: medical management      History Of Present Illness  Sofie Mcdonough \"Herb\" is a 38 y.o. adult with hx of gender dysphoria who is POD #1 METOIDIOPLASTY WITH URETHRAL LENGTHENING. Today he is c/o 5/10 pain that he says is much better. He also c/o pressure in his groin and rectum that he attributes to the Gonzalez. This is relieved when he is upright. He denies nausea and SOB. Partner at bedside. They are anticipating discharge today. Remainder of ROS reviewed and negative except as indicated in HPI.     Past Medical History  He has a past medical history of Anxiety, Drug use, and Gender dysphoria.    Surgical History  He has a past surgical history that includes Hysterectomy; Anterior cervical discectomy w/ fusion; Appendectomy; Breast surgery (04/18/2010); Cholecystectomy; Hip fracture surgery (Right); Mastectomy (Bilateral, 01/2013); Neck surgery; Total abdominal hysterectomy w/ bilateral salpingoophorectomy (07/12/2011); and Penis surgery (12/28/2023).    Social History     Tobacco Use    Smoking status: Former     Packs/day: 0.20     Years: 17.00     Additional pack years: 0.00     Total pack years: 3.40     Types: Cigarettes    Smokeless tobacco: Current    Tobacco comments:     Tobaccoless vaping   Vaping Use    Vaping Use: Every day   Substance Use Topics    Alcohol use: Not Currently    Drug use: Not Currently     Comment: hx of abuse: opioids, inhalant, fentanyl       Family History   Problem Relation Name Age of Onset    Heart disease Mother      Depression Father      Diabetes Father      Hypertension Father      Other (substance abuse) Father      Depression Brother      Ovarian cancer Maternal Grandmother      Cancer Paternal Grandmother      Diabetes Paternal Grandfather      Kidney disease Paternal Grandfather         Allergies  Metronidazole; Trazodone; Vancomycin; " Bee venom protein (honey bee); Morphine; Prochlorperazine; Sumatriptan; Peanut oil; Haloperidol; Latex, natural rubber; and Metoclopramide    Vitals:    12/29/23 0730   BP: 126/58   Pulse: 72   Resp:    Temp: 35.9 °C (96.6 °F)   SpO2: 91%       Vitals:    12/28/23 0813   Weight: 118 kg (260 lb 2.3 oz)       Scheduled medications  acetaminophen, 975 mg, oral, q6h  ceFAZolin, 3 g, intravenous, q8h  docusate sodium, 100 mg, oral, BID  heparin (porcine), 7,500 Units, subcutaneous, q8h FANTASMA  ketorolac, 30 mg, intravenous, q8h FANTASMA  methadone, 96 mg, oral, Daily  polyethylene glycol, 17 g, oral, Daily      Continuous medications  sodium chloride 0.9%, 100 mL/hr, Last Rate: 100 mL/hr (12/29/23 0537)      PRN medications  PRN medications: ondansetron **OR** ondansetron, oxybutynin, oxyCODONE    Results for orders placed or performed during the hospital encounter of 12/28/23 (from the past 24 hour(s))   CBC   Result Value Ref Range    WBC 14.3 (H) 4.4 - 11.3 x10*3/uL    nRBC 0.0 0.0 - 0.0 /100 WBCs    RBC 4.40 4.00 - 5.90 x10*6/uL    Hemoglobin 13.3 12.0 - 17.5 g/dL    Hematocrit 40.2 36.0 - 52.0 %    MCV 91 80 - 100 fL    MCH 30.2 26.0 - 34.0 pg    MCHC 33.1 32.0 - 36.0 g/dL    RDW 13.1 11.5 - 14.5 %    Platelets 182 150 - 450 x10*3/uL   Basic metabolic panel   Result Value Ref Range    Glucose 94 74 - 99 mg/dL    Sodium 136 136 - 145 mmol/L    Potassium 3.7 3.5 - 5.3 mmol/L    Chloride 102 98 - 107 mmol/L    Bicarbonate 27 21 - 32 mmol/L    Anion Gap 11 10 - 20 mmol/L    Urea Nitrogen 9 6 - 23 mg/dL    Creatinine 1.04 0.50 - 1.30 mg/dL    eGFR 71 >60 mL/min/1.73m*2    Calcium 8.0 (L) 8.6 - 10.3 mg/dL       Constitutional: Well developed, awake, alert, oriented x3, no acute distress, cooperative   Eyes: EOMI, clear sclera   ENMT: mucous membranes moist, no lesions seen   Head/Neck: Neck supple, no apparent injury, head atraumatic   Respiratory/Thorax: CTAB, good chest expansion, respirations even and unlabored    Cardiovascular: Regular rate and rhythm, no murmurs/rubs/gallops, normal S1 and S 2   Gastrointestinal: Abdomen nondistended, soft, nontender, +BS, no bruits   Musculoskeletal: ROM intact, no joint swelling, normal  strength   Extremities: no cyanosis, edema, contusions or clubbing   Neurological: no focal deficit, pt alert and oriented x3   Psychological: Appropriate affect and behavior, pleasant   Skin: Warm and dry, no lesions, no rashes  Genitourinary: Gonzalez draining clear dark yellow urine       Assessment/Plan  Gender dysphoria POD #1 METOIDIOPLASTY WITH URETHRAL LENGTHENING     - continue pain and bowel regimen, pt advised to stay ahead of pain     - management per primary surgery service     - Gonzalez in place, pt will discharge with it  Hx substance abuse     - pt follows with a MAT program, continue home methadone  DVT ppx: subcutaneous heparin      Shereen Sierra, CNP  Hospital Medicine

## 2023-12-29 NOTE — DISCHARGE SUMMARY
Discharge Diagnosis  Gender dysphoria    Issues Requiring Follow-Up  S/p metoidioplasty 12/28    Test Results Pending At Discharge  Pending Labs       No current pending labs.            Hospital Course   Herb Mcdonough is a 38 y.o. adult presenting with gender dysphoria who presents for metoidioplasty. Post op course uncomplicated. At the time of discharge, patient's pain was controlled with oral analgesia, patient was urinating, having BMs, sleeping, and eating well. Patient was discharged home with scripts and follow up appointments. Discharge plan was discussed with the patient and all of the patient's questions were answered.      Pertinent Physical Exam At Time of Discharge  Physical Exam  Constitutional:       General: He is not in acute distress.  Cardiovascular:      Rate and Rhythm: Normal rate.   Pulmonary:      Effort: Pulmonary effort is normal.   Abdominal:      General: Abdomen is flat.      Palpations: Abdomen is soft.   Genitourinary:     Comments: Suprapubic catheter draining clear yellow urine. Surgical dressings c/d/i  Skin:     General: Skin is warm and dry.   Neurological:      Mental Status: He is alert and oriented to person, place, and time.         Home Medications     Medication List      START taking these medications     oxyCODONE-acetaminophen 5-325 mg tablet; Commonly known as: Percocet;   Take 1 tablet by mouth every 6 hours if needed for severe pain (7 - 10).     CONTINUE taking these medications     methadone 10 mg/mL solution; Commonly known as: Dolophine   testosterone cypionate 200 mg/mL injection; Commonly known as:   Depo-Testosterone     STOP taking these medications     chlorhexidine 4 % external liquid; Commonly known as: Hibiclens       Outpatient Follow-Up  Future Appointments   Date Time Provider Department Center   1/8/2024 10:00 AM Mila Smith, APRN-CNP [id number] [place]       RADHAMES Allen-DAQUAN

## 2024-01-02 ENCOUNTER — APPOINTMENT (OUTPATIENT)
Dept: UROLOGY | Facility: CLINIC | Age: 39
End: 2024-01-02
Payer: COMMERCIAL

## 2024-01-02 NOTE — PROGRESS NOTES
"Outpatient Psychology Initial Appointment  Subjective   Sofie Mcdonough \"Cam\", a 38 y.o. adult, for initial evaluation visit. Participated in diagnostic interview and identification of goals for treatment.      Patient is referred by [facility] Gender Care team.      Reason:  He has been experiencing ongoing gender dysphoria.      Psychosocial History Herb is a 38 year old transgender man who is presenting for psychological services in an effort to gain a letter of support for transgender affirmative care.  Herb previously had letters lined up for his procedure scheduled for 2023, but had one of those letters .  This evaluation is in service of updating those letters prior to his procedure.     Herb states that he started  his transition in .  His earliest recollection of feeling different reportedly began in the 2nd grade, though he did not have the language to put to the experience.  He states that he often played \"Dad\" or \"boyfriend\" in games and always wanted male action figures to play with.  As he got older, he tried to \"fit in\" with female friends because he did not see another option.   Herb lived in [place] until freshman year of high school, and then moved to [place], [place].  In high school, Herb started to learn about labels for his experience.  He stated that he didn't feel he fit in with the categories given for sexual orientation such as \"lesbian\", \"bi\", or \"straight\", and later came to realize it was gender that was the problem.  At the age of 19, Herb learned about the concept of transgender and suddenly felt there was a possibility to live what he felt inside.  He eventually started on Hormone Replacement Therapy (HRT) in .  Herb states that he has been happy with the results of the HRT overall, but is currently in pursuit of bottom surgery.  He is going for metoidioplasty and a scrotoplasty with implants.  This is what he considers his \"final surgery\" in an effort to feel " "comfortable in his body.  He has already had top surgery and a hysterectomy, and has been living his male identity for 12 years to this date.      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  He is currently out on disability due to PTSD and injuries sustained.      Herb met his spouse in 2012, and then re-met in 2013 \"for real\", and started dating in 2014.  They have been together since that time.  Both had difficulties with substance abuse, but Herb states that they are both substance free currently.  They have an 8 year old daughter who has autism and is non-verbal.  Herb states that his gender dysphoria can at times interfere with his relationship sexually due to how he feels about his body, although states that his partner is supportive and does not define by \"what is in your pants\".      Mental Status Evaluation:  Appearance:  age appropriate and casually dressed   Behavior:  normal   Speech:  normal pitch and normal volume   Mood:  normal   Affect:  normal   Thought Process:  normal   Thought Content:  normal   Sensorium:  person, place, time/date, situation, day of week, month of year, and year   Cognition:  grossly intact   Insight:  Intact, as evidenced by ability to connect experience with goals and communicate internal experience.          Assessment/Plan     Diagnosis:   Patient Active Problem List   Diagnosis    Opioid use disorder    Gender dysphoria       Treatment Goals:  Specify outcomes written in observable, behavioral terms:   Alleviate gender dysphoria    Treatment Plan/Recommendations: Letter of support for transgender affirmative care.  Provider is available for ongoing care if needed.       Review with patient: Treatment plan reviewed with the patient.    Zachariah Raya PsyD    "

## 2024-01-03 DIAGNOSIS — G89.18 POST-OP PAIN: Primary | ICD-10-CM

## 2024-01-03 RX ORDER — IBUPROFEN 800 MG/1
800 TABLET ORAL EVERY 8 HOURS PRN
Qty: 60 TABLET | Refills: 0 | Status: SHIPPED | OUTPATIENT
Start: 2024-01-03

## 2024-01-03 NOTE — OP NOTE
"METOIDOPLASTY WITH URETHRAL LENGTHENING Operative Note     Date: 2023  OR Location: [facility]    Name: Sofie Mcdonough \"Herb\", : 1985, Age: 38 y.o., MRN: [id number], Sex: adult    Diagnosis  Pre-op Diagnosis     * Gender identity disorder, unspecified [F64.9] Post-op Diagnosis     * Gender identity disorder, unspecified [F64.9]     Procedures    * METOIDOPLASTY WITH URETHRAL LENGTHENING    Surgeons      * Manuel Ramirez - Primary    Resident/Fellow/Other Assistant:  Surgeon(s) and Role:    Procedure Summary  Anesthesia: General  ASA: II  Anesthesia Staff: Anesthesiologist: Fredo Wolf MD; Beni Ramirez MD  CRNA: RADHAMES Garrison-CRNA; CJ Bae  C-AA: TIMA Fall  Estimated Blood Loss: 200 mL  Intra-op Medications:   Medication Name Total Dose   EPINEPHrine (Adrenalin) injection 30 mg   gentamicin (Garamycin) 350 mg in dextrose 5 % in water (D5W) 100 mL  mg              Anesthesia Record               Intraprocedure I/O Totals          Intake    Dexmedetomidine 0.00 mL    The total shown is the total volume documented since Anesthesia Start was filed.    LR 1800.00 mL    ceFAZolin (Ancef) IVPB 2 g/100 mL D5 (premix) 100.00 mL    gentamicin (Garamycin) 350 mg in dextrose 5 % in water (D5W) 100 mL .00 mL    Total Intake 2000 mL       Output    Urine 500 mL    Est. Blood Loss 100 mL    Total Output 600 mL       Net    Net Volume 1400 mL          Specimen: No specimens collected     Staff:   Circulator: Carissa Pickens RN; Lorena Talley RN  Relief Circulator: Mandy Yates RN  Scrub Person: Mandy Yates RN; Priya Slaughter; Carissa Witt RN         Drains and/or Catheters:   Closed/Suction Drain Right Groin Bulb (Active)   Site Description Unable to view 23   Dressing Status Clean;Dry 23   Drainage Appearance Serosanguineous 23   Status To bulb suction 23   Output (mL) 30 mL 23 0602       Suprapubic Catheter " Non-latex 16 Fr. (Active)   Site/Stoma Assessment JUAN CARLOS 12/29/23 0934   Dressing Status Dry;Clean 12/29/23 0934   Dressing Type Dry dressing 12/29/23 0934   Collection Container Standard drainage bag 12/28/23 1935   Output (mL) 450 mL 12/29/23 1500       [REMOVED] NG/OG/Feeding Tube OG - [place] sump 18 Fr Center mouth (Removed)       Tourniquet Times:         Implants:     Findings:   Excellent metoidioplasty was performed by performing a ring flap type  bilateral inner labial flap metoidioplasty with ventral chordee  release. Meatus was hypospadiac    Indications: Herb Mcdonough is an 38 y.o. adult who is having surgery for Gender identity disorder, unspecified [F64.9].     Patient trans-man who has lived in a gender  congruent role for several years.  He has had top surgery as well as  removal of female internal organs.  He desired to proceed with the  bottom gender affirmation surgery.  He met all the [employer] criteria for surgery including  living a socially congruent role, being on appropriate cross-gender  hormone therapy, and letters of support from different medical as  well as mental health providers.  Options for bottom surgery were  discussed including metoidioplasty as well as phalloplasty.  Pros and  cons of each approach were discussed.  The patient elected to have a  metoidioplasty with urethral lengthening.  he understood that we would require a second-stage monsplasty  to do a mons reduction, scrotal fold eduction, and do any touchup of any redundant tags from  the first operation    The patient was seen in the preoperative area. The risks, benefits, complications, treatment options, non-operative alternatives, expected recovery and outcomes were discussed with the patient. The possibilities of reaction to medication, pulmonary aspiration, injury to surrounding structures, bleeding, recurrent infection, the need for additional procedures, failure to diagnose a  condition, and creating a complication requiring transfusion or operation were discussed with the patient. The patient concurred with the proposed plan, giving informed consent.  The site of surgery was properly noted/marked if necessary per policy. The patient has been actively warmed in preoperative area. Preoperative antibiotics have been ordered and given within 1 hours of incision. Venous thrombosis prophylaxis have been ordered including bilateral sequential compression devices and chemical prophylaxis    Procedure Details:    OPERATION/PROCEDURE:    Metoidioplasty with urethral lengthening. This included the following  procedures:  1.  Reconstruction of urethra, 47668.  2.  Vaginectomy, 32567.  3.  Complex scrotoplasty, 09162.  4.  Complex perineoplasty  and wound closure 90 cm2   5.  Bilateral labia minora advancement flaps to construct the      urethra, 15740 x2.  6.  Bulbuspongiosus muscle flap mobilization and reconstruction for uretharl anastomosis coverage 97883  7. SP tube placement     OPERATIVE DETAILS:    Patient was brought to the operating suite and laid supine on the  operating table.  He was anesthetized and orotracheally intubated.   Prior to this, an institution approved time-out was taken.  He was  placed in dorsal lithotomy position and prepped and draped in a  standard sterile fashion.  We began the procedure by starting to  place a suprapubic tube.  The urethral meatus was cannulated with the  catheters just placed in the bladder.  This was distended to 300 cc.   2 fingerbreadths above the pubic symphysis, an incision was made, and  through this, a one-step introducer kit was used to put a trocar and  the inner obturator into the bladder.  The inner obturator was  removed, and through the outer sheath, a 16-Sinhala Councill tip  catheter was placed.  The balloon was deployed and the sleeve was  removed.  The catheter was secured with a permanent stitch.  At this  time, the indwelling  urethral Gonzalez was removed.  We then began our  initial inspections and surgical markings were made.  He had a decent  amount of clitoral hypertrophy but did have significant ventral  chordee as well.  The inner labia tissue was noted to be capacious.   Looking at his favorable anatomy, we decided to proceed with a ring  flap metoidioplasty.  This would be based on bilateral inner labial  flaps which would be advanced anteriorly to form the urethral plate.  We made  our skin markings and then made incisions in bilateral labial flaps.   The flap incisions were made posteriorly.  They extended along the  posterior fourchette, meeting in the midline.  Anteriorly they went  along the sub-clitoral region.  Thick flaps were harvested  bilaterally in the area above the urethral meatus.  The flap markings  met in the midline.  In this area, the entire unitary flap was then  lifted off the underlying corpora cavernosa.  This allowed for this  flap to be completely lifted off the cavernosa and be marched towards  the tip of the clitoris.  This allowed for the ventral chordee to be  completely released.  When the ventral chordee was released and the  clitoris was straightened out, we noticed that we were able to add  approximately 4 cm to the clitoral length.  We decided not to release  the dorsal suspensory ligament in order to make sure that the  clitoris did not drop caudally.  At this point in time, the dorsal  urethral plate was constructed by anastomosing the labia minora flaps  to each other on the inside aspect.  The backside of the flaps were  then secured to the corpora cavernosa using fibrin glue. We then began a ventral urethroplasty.  This would be a pars  fixa urethroplasty. A small amount of the vaginal mucosa on the  ventral aspect of the orthotopic meatus was incised and this was  advanced.  The rest of the ventral urethroplasty was completed by  sewing the lateral edges of the bilateral labia minora flaps in  the  midline.  This was done in the standard hypospadias fashion in a  running subcuticular fashion.  Using a 5-0 PDS, a second layer of  anastomosis was done.  we then decided to provide  additional coverage of our urethral anastomosis.  the vaginal mucosa external to the pelvic floor was excised sharply. We identified the bulbosponiosus muscle on each side. this was released from the crura bilaterally. We were then able to advance it  medially and approximate in the midline to provide coverage of the pars fixa. After completion of the entire urethroplasty, a  12-[place] grigsby catheter was placed through the urethra all the  way into the bladder, confirmed with the return of urine.  This was  then removed.  For the next stage of the operation, we focused our attention on the  vaginectomy.  The smooth part of the vaginal mucosa, that is the one  that is distal to the pelvic floor musculature, had been excised sharply.   The part of the vaginal mucosa with rugosities was ablated using a  rollerball Bovie on cut current at 60.  Multiple passes were made to  ensure that we had ablated all aspects of the vaginal mucosa.  When  this was completed, the pelvic floor and the  diaphragm was closed  with figure-of-eight PDS sutures.  Prior to closure of this area, a  drain was left and then it exited through the right ischiorectal  fossa.  The wound was copiously irrigated.  We then proceeded with  making incisions for our scrotoplasty.  Skin markings were made for  the scrotoplasty and these were essentially rotational flaps based on  the anterior labial arterial branches.  These anteriorly based flaps  were disconnected posteriorly and rotated anteriorly approximately 180 degrees to perform a [place] style scrotoplasty.  Once these flaps were brought in the midline, they were  temporarily secured using skin clips.  We then proceeded with  perineoplasty.  The perineum was reconstructed to give the appearance  of a male  perineum in 3 different layers by advancing a deeper layer  of soft tissue and muscle followed by a dermal layer and followed by  a skin layer.  Total mobilization of the area was approximately 90 cm² in a complex fashion.  Once this was completed, we then proceeded  with completion of our scrotoplasty.  The flaps had been rotated  anteriorly and multiple deep sutures were placed to ensure the  construction of an aesthetic appearing neoscrotum.  Finally some of  the redundant skin around the clitoris was removed and skin incisions  were made to allow formation of the ventral skin over the  metoidioplasty reconstruction.  The skin was closed in multiple  Z-plasty fashion to avoid scarring.  After this was done, skin glue  was applied and dressings were applied.  The patient was  repositioned, awakened, and transferred to the PACU in a stable  condition.   Complications:  None; patient tolerated the procedure well.    Disposition: PACU - hemodynamically stable.  Condition: stable         Additional Details: he will stay in the hospital overnight and be discharged POD 1     Attending Attestation: I was present and scrubbed for the entire procedure.    Manuel Ramirez  Phone Number: [phone]

## 2024-01-08 ENCOUNTER — OFFICE VISIT (OUTPATIENT)
Dept: UROLOGY | Facility: CLINIC | Age: 39
End: 2024-01-08
Payer: COMMERCIAL

## 2024-01-08 VITALS
HEART RATE: 81 BPM | DIASTOLIC BLOOD PRESSURE: 74 MMHG | BODY MASS INDEX: 40.62 KG/M2 | TEMPERATURE: 97.3 F | HEIGHT: 68 IN | SYSTOLIC BLOOD PRESSURE: 124 MMHG | WEIGHT: 268 LBS

## 2024-01-08 DIAGNOSIS — F64.9 GENDER DYSPHORIA: Primary | ICD-10-CM

## 2024-01-08 DIAGNOSIS — F11.90 OPIOID USE DISORDER: ICD-10-CM

## 2024-01-08 DIAGNOSIS — G89.18 POST-OP PAIN: ICD-10-CM

## 2024-01-08 PROCEDURE — 99024 POSTOP FOLLOW-UP VISIT: CPT | Performed by: NURSE PRACTITIONER

## 2024-01-08 RX ORDER — KETOROLAC TROMETHAMINE 10 MG/1
TABLET, FILM COATED ORAL
Qty: 20 TABLET | Refills: 0 | Status: SHIPPED | OUTPATIENT
Start: 2024-01-08 | End: 2024-05-30 | Stop reason: ALTCHOICE

## 2024-01-08 NOTE — LETTER
January 8, 2024     Patient: Ilia Mcdonough   Date of Birth: N/A   Date of Visit: 1/8/2024       To Whom It May Concern:    Sofie Mcdonough was seen in my clinic on 1/8/2024 at 10:00 am. Please excuse Ilia for her absence from school on this day to make the appointment.    If you have any questions or concerns, please don't hesitate to call.         Sincerely,         Mila Smith, APRN-CNP        CC: No Recipients

## 2024-01-08 NOTE — PROGRESS NOTES
"GENDER CARE POST-OP     HISTORY OF PRESENT ILLNESS:   Herb Mcdonough is a 38 y.o. trans man s/p metoidioplasty with UL on 12/28/23.    INTERVAL HISTORY:  Returns today for POV  Seen with partner Yanet  Doing well, still having some pain  Ibuprofen doesn't always work, ketorolac worked better  Taking Percocet occasionally, prefers to avoid this  Concerned about open areas R scrotum, base    PAST MEDICAL HISTORY:  Past Medical History:   Diagnosis Date    Anxiety     Drug use     Gender dysphoria        PAST SURGICAL HISTORY:  Past Surgical History:   Procedure Laterality Date    ANTERIOR CERVICAL DISCECTOMY W/ FUSION      x4: 11/3/17, 8/23/19 and others    APPENDECTOMY      BREAST SURGERY  04/18/2010    reduction    CHOLECYSTECTOMY      laparoscopic    HIP FRACTURE SURGERY Right     HYSTERECTOMY      MASTECTOMY Bilateral 01/2013    NECK SURGERY      x2    PENIS SURGERY  12/28/2023    METOIDiOPLASTY WITH URETHRAL LENGTHENING    TOTAL ABDOMINAL HYSTERECTOMY W/ BILATERAL SALPINGOOPHORECTOMY  07/12/2011        ALLERGIES:   Allergies   Allergen Reactions    Metronidazole Anaphylaxis    Trazodone Anaphylaxis, Headache, Nausea/vomiting and Syncope    Vancomycin Anaphylaxis, Shortness of breath and Swelling     Red man syndrome    Bee Venom Protein (Honey Bee) Swelling    Morphine Headache and Nausea/vomiting    Prochlorperazine Anxiety     \"skin crawling \"    Sumatriptan Headache and Rash    Peanut Oil Nausea/vomiting    Haloperidol Anxiety     \"skin crawling \"    Latex, Natural Rubber Hives and Rash    Metoclopramide Anxiety, Headache and Nausea Only        MEDICATIONS:     Current Outpatient Medications:     ibuprofen 800 mg tablet, Take 1 tablet (800 mg) by mouth every 8 hours if needed for moderate pain (4 - 6) or mild pain (1 - 3)., Disp: 60 tablet, Rfl: 0    oxyCODONE-acetaminophen (Percocet) 5-325 mg tablet, Take 1 tablet by mouth every 6 hours if needed for severe pain (7 - 10)., Disp: 5 tablet, Rfl: 0    testosterone " cypionate (Depo-Testosterone) 200 mg/mL injection, Inject 0.6 mL (120 mg) into the muscle 1 (one) time per week., Disp: , Rfl:     methadone (Dolophine) 10 mg/mL solution, Take 9.7 mL (97 mg) by mouth once daily., Disp: , Rfl:      SOCIAL HISTORY:  Patient  reports that he has quit smoking. His smoking use included cigarettes. He has a 3.40 pack-year smoking history. He uses smokeless tobacco. He reports that he does not currently use alcohol. He reports that he does not currently use drugs.   Social History     Socioeconomic History    Marital status:      Spouse name: Not on file    Number of children: Not on file    Years of education: Not on file    Highest education level: Not on file   Occupational History    Not on file   Tobacco Use    Smoking status: Former     Packs/day: 0.20     Years: 17.00     Additional pack years: 0.00     Total pack years: 3.40     Types: Cigarettes    Smokeless tobacco: Current    Tobacco comments:     Tobaccoless vaping   Vaping Use    Vaping Use: Every day   Substance and Sexual Activity    Alcohol use: Not Currently    Drug use: Not Currently     Comment: hx of abuse: opioids, inhalant, fentanyl    Sexual activity: Not on file   Other Topics Concern    Not on file   Social History Narrative    Not on file     Social Determinants of Health     Financial Resource Strain: Not on file   Food Insecurity: Not on file   Transportation Needs: Not on file   Physical Activity: Not on file   Stress: Not on file   Social Connections: Not on file   Intimate Partner Violence: Not on file   Housing Stability: Not on file       FAMILY HISTORY:  Family History   Problem Relation Name Age of Onset    Heart disease Mother      Depression Father      Diabetes Father      Hypertension Father      Other (substance abuse) Father      Depression Brother      Ovarian cancer Maternal Grandmother      Cancer Paternal Grandmother      Diabetes Paternal Grandfather      Kidney disease Paternal  Grandfather         REVIEW OF SYSTEMS:  All systems reviewed, pertinent negatives as noted in HPI.    PHYSICAL EXAM:  Visit Vitals  /74 (BP Location: Right arm, Patient Position: Sitting, BP Cuff Size: Large adult)   Pulse 81   Temp 36.3 °C (97.3 °F) (Temporal)     Constitutional: Well-developed and well-nourished. No distress.    Head: Normocephalic and atraumatic.    Neck: Normal range of motion.    Cardiovascular: Normal rate.    Pulmonary/Chest: Effort normal. No respiratory distress.   Abdominal: Nondistended.  : See below.  Musculoskeletal: Normal range of motion.    Neurological: Alert and oriented.  Psychiatric: Normal mood and affect. Thought content normal.      LABORATORY REVIEW:   Lab Results   Component Value Date    BUN 9 12/29/2023    CREATININE 1.04 12/29/2023    EGFR 71 12/29/2023     12/29/2023    K 3.7 12/29/2023     12/29/2023    CO2 27 12/29/2023    CALCIUM 8.0 (L) 12/29/2023      Lab Results   Component Value Date    WBC 14.3 (H) 12/29/2023    RBC 4.40 12/29/2023    HGB 13.3 12/29/2023    HCT 40.2 12/29/2023    MCV 91 12/29/2023    MCH 30.2 12/29/2023    MCHC 33.1 12/29/2023    RDW 13.1 12/29/2023     12/29/2023               Assessment:     Encounter Diagnoses   Name Primary?    Gender dysphoria Yes    Opioid use disorder     Post-op pain        Herb Mcdonough is a 38 y.o. trans man s/p metoidioplasty with UL on 12/28/23. Doing well post-op. Pain not relieved with ibuprofen.     On exam, ~2 cm opening along R scrotal suture line with scant fibrinous exudate, ~2 mm deep. Tiny opening at base of scrotum with serosanguineous drainage. SPT site with scant mucoid drainage. Urethral catheter removed now.     Plan:   Wound care with calcium alginate to open areas  Continue to apply Xeroform, gauze  Ketorolac 10-20 mg po q 6 hrs prn mod-severe pain; aware not to take this with ibuprofen  RTC in 2 weeks for SPT removal and reassessment  Encouraged to call in the interim with any  questions, concerns

## 2024-01-13 ENCOUNTER — APPOINTMENT (OUTPATIENT)
Dept: PRIMARY CARE | Facility: CLINIC | Age: 39
End: 2024-01-13
Payer: COMMERCIAL

## 2024-01-17 ENCOUNTER — APPOINTMENT (OUTPATIENT)
Dept: UROLOGY | Facility: CLINIC | Age: 39
End: 2024-01-17
Payer: COMMERCIAL

## 2024-01-22 ENCOUNTER — TELEMEDICINE (OUTPATIENT)
Dept: UROLOGY | Facility: CLINIC | Age: 39
End: 2024-01-22
Payer: COMMERCIAL

## 2024-01-22 DIAGNOSIS — F64.9 GENDER DYSPHORIA: Primary | ICD-10-CM

## 2024-01-22 PROCEDURE — 99024 POSTOP FOLLOW-UP VISIT: CPT | Performed by: NURSE PRACTITIONER

## 2024-01-22 NOTE — PROGRESS NOTES
GENDER CARE POST-OP     HISTORY OF PRESENT ILLNESS:   Herb Mcdonough is a 38 y.o. trans man s/p metoidioplasty with UL on 12/28/23.    INTERVAL HISTORY:  Seen with spouse Yanet for POV  Went to ED over the weekend, yellow drainage and pain from SPT  Got dose of IV abx in ED for UTI, started cefdinir today  Continues to have dysuria and a little blood  Changing dressing after every void  Strong urine stream noted  Occasional dribbling, leaking from urethra; no other areas of leakage  Continues to have sloughing from tip of penis  Wounds healing well, would like area of dehiscence revised at stage 2  No bleeding or sloughing from base of scrotum  Using Aquacel only, unable to use Xeroform because it sticks to gauze  No fevers or chills    An interactive audio and video telecommunication system which permits real time communications between the patient (at the originating site) and provider (at the distant site) was utilized to provide this telehealth service.   Verbal consent was requested and obtained from Sofie Mcdonough on this date, 01/22/24 for a telehealth visit.       PAST MEDICAL HISTORY:  Past Medical History:   Diagnosis Date    Anxiety     Drug use     Gender dysphoria        PAST SURGICAL HISTORY:  Past Surgical History:   Procedure Laterality Date    ANTERIOR CERVICAL DISCECTOMY W/ FUSION      x4: 11/3/17, 8/23/19 and others    APPENDECTOMY      BREAST SURGERY  04/18/2010    reduction    CHOLECYSTECTOMY      laparoscopic    HIP FRACTURE SURGERY Right     HYSTERECTOMY      MASTECTOMY Bilateral 01/2013    NECK SURGERY      x2    PENIS SURGERY  12/28/2023    METOIDiOPLASTY WITH URETHRAL LENGTHENING    TOTAL ABDOMINAL HYSTERECTOMY W/ BILATERAL SALPINGOOPHORECTOMY  07/12/2011        ALLERGIES:   Allergies   Allergen Reactions    Metronidazole Anaphylaxis    Trazodone Anaphylaxis, Headache, Nausea/vomiting and Syncope    Vancomycin Anaphylaxis, Shortness of breath and Swelling     Red man syndrome    Bee Venom Protein  "(Honey Bee) Swelling    Morphine Headache and Nausea/vomiting    Prochlorperazine Anxiety     \"skin crawling \"    Sumatriptan Headache and Rash    Peanut Oil Nausea/vomiting    Haloperidol Anxiety     \"skin crawling \"    Latex, Natural Rubber Hives and Rash    Metoclopramide Anxiety, Headache and Nausea Only        MEDICATIONS:     Current Outpatient Medications:     ibuprofen 800 mg tablet, Take 1 tablet (800 mg) by mouth every 8 hours if needed for moderate pain (4 - 6) or mild pain (1 - 3)., Disp: 60 tablet, Rfl: 0    ketorolac (Toradol) 10 mg tablet, 1-2 tablets every 6 hours as needed for severe pain NTE 4 tablets per day., Disp: 20 tablet, Rfl: 0    methadone (Dolophine) 10 mg/mL solution, Take 9.7 mL (97 mg) by mouth once daily., Disp: , Rfl:     oxyCODONE-acetaminophen (Percocet) 5-325 mg tablet, Take 1 tablet by mouth every 6 hours if needed for severe pain (7 - 10)., Disp: 5 tablet, Rfl: 0    testosterone cypionate (Depo-Testosterone) 200 mg/mL injection, Inject 0.6 mL (120 mg) into the muscle 1 (one) time per week., Disp: , Rfl:      SOCIAL HISTORY:  Patient  reports that he has quit smoking. His smoking use included cigarettes. He has a 3.40 pack-year smoking history. He uses smokeless tobacco. He reports that he does not currently use alcohol. He reports that he does not currently use drugs.   Social History     Socioeconomic History    Marital status:      Spouse name: Not on file    Number of children: Not on file    Years of education: Not on file    Highest education level: Not on file   Occupational History    Not on file   Tobacco Use    Smoking status: Former     Packs/day: 0.20     Years: 17.00     Additional pack years: 0.00     Total pack years: 3.40     Types: Cigarettes    Smokeless tobacco: Current    Tobacco comments:     Tobaccoless vaping   Vaping Use    Vaping Use: Every day   Substance and Sexual Activity    Alcohol use: Not Currently    Drug use: Not Currently     Comment: hx " of abuse: opioids, inhalant, fentanyl    Sexual activity: Not on file   Other Topics Concern    Not on file   Social History Narrative    Not on file     Social Determinants of Health     Financial Resource Strain: Not on file   Food Insecurity: Not on file   Transportation Needs: Not on file   Physical Activity: Not on file   Stress: Not on file   Social Connections: Not on file   Intimate Partner Violence: Not on file   Housing Stability: Not on file       FAMILY HISTORY:  Family History   Problem Relation Name Age of Onset    Heart disease Mother      Depression Father      Diabetes Father      Hypertension Father      Other (substance abuse) Father      Depression Brother      Ovarian cancer Maternal Grandmother      Cancer Paternal Grandmother      Diabetes Paternal Grandfather      Kidney disease Paternal Grandfather         REVIEW OF SYSTEMS:  All systems reviewed, pertinent negatives as noted in HPI.    PHYSICAL EXAM:  There were no vitals taken for this visit.    Constitutional: Well-developed and well-nourished. No distress.    Head: Normocephalic and atraumatic.    Neck: Normal range of motion.    Pulmonary/Chest: Effort normal. No respiratory distress.   Musculoskeletal: Normal range of motion.    Neurological: Alert and oriented.  Psychiatric: Normal mood and affect. Thought content normal.      LABORATORY REVIEW:   Lab Results   Component Value Date    BUN 9 12/29/2023    CREATININE 1.04 12/29/2023    EGFR 71 12/29/2023     12/29/2023    K 3.7 12/29/2023     12/29/2023    CO2 27 12/29/2023    CALCIUM 8.0 (L) 12/29/2023      Lab Results   Component Value Date    WBC 14.3 (H) 12/29/2023    RBC 4.40 12/29/2023    HGB 13.3 12/29/2023    HCT 40.2 12/29/2023    MCV 91 12/29/2023    MCH 30.2 12/29/2023    MCHC 33.1 12/29/2023    RDW 13.1 12/29/2023     12/29/2023               Assessment:     Encounter Diagnosis   Name Primary?    Gender dysphoria Yes       Herb Mcdonough is a 38 y.o. trans man  s/p metoidioplasty with UL on 12/28/23. Doing well post-op. Pain not relieved with ibuprofen.     Doing well post-op, currently being treated for UTI. Some pain and urinary issues, likely related to UTI and/or presence of SPT.     Plan:   Anticipate improvement in symptoms over course of abx  Would leave SPT in place until closer to completing abx, will be better able to assess for urinary issues that would necessitate continuing SPT  RTC in 2 weeks in person if able  Encouraged to call in the interim with any questions, concerns

## 2024-01-30 NOTE — PROGRESS NOTES
Ok to remove SPT. Please ensure balloon is completed deflated prior to removing.   Cover site with dry gauze, tape securely.

## 2024-02-05 ENCOUNTER — TELEMEDICINE (OUTPATIENT)
Dept: UROLOGY | Facility: CLINIC | Age: 39
End: 2024-02-05
Payer: COMMERCIAL

## 2024-02-05 DIAGNOSIS — F64.9 GENDER DYSPHORIA: Primary | ICD-10-CM

## 2024-02-05 PROCEDURE — 99024 POSTOP FOLLOW-UP VISIT: CPT | Performed by: UROLOGY

## 2024-02-05 NOTE — PROGRESS NOTES
Subjective   Patient ID: Herb Mcdonough is a 38 y.o. adult who presents for post-op visit    INTERVAL HISTORY: Patient is healing well, doing well, incisions healing well and urinating well    PREVIOUS HPI FROM LILY'S NOTE:  Herb Mcdonough is a 38 y.o. trans man s/p metoidioplasty with UL on 12/28/23.    Seen with spouse Yanet for POV  Went to ED over the weekend, yellow drainage and pain from SPT  Got dose of IV abx in ED for UTI, started cefdinir today  Continues to have dysuria and a little blood  Changing dressing after every void  Strong urine stream noted  Occasional dribbling, leaking from urethra; no other areas of leakage  Continues to have sloughing from tip of penis  Wounds healing well, would like area of dehiscence revised at stage 2  No bleeding or sloughing from base of scrotum  Using Aquacel only, unable to use Xeroform because it sticks to gauze  No fevers or chills     An interactive audio and video telecommunication system which permits real time communications between the patient (at the originating site) and provider (at the distant site) was utilized to provide this telehealth service.   Verbal consent was requested and obtained from Sofie Mcdonough on this date, 01/22/24 for a telehealth visit.            Review of Systems    Objective   There were no vitals taken for this visit.    Physical Exam  Virtual visit      Assessment/Plan   There are no diagnoses linked to this encounter.  8 y.o. trans man s/p metoidioplasty with UL on 12/28/23 presenting virtually for post-op visit.   Patient has no new complaints, reports to be healing well. Pictures show incisions are healing well and everything appears to look normal.

## 2024-04-01 ENCOUNTER — APPOINTMENT (OUTPATIENT)
Dept: UROLOGY | Facility: CLINIC | Age: 39
End: 2024-04-01
Payer: COMMERCIAL

## 2024-05-21 ENCOUNTER — TELEMEDICINE (OUTPATIENT)
Dept: UROLOGY | Facility: CLINIC | Age: 39
End: 2024-05-21
Payer: COMMERCIAL

## 2024-05-21 DIAGNOSIS — F64.9 GENDER DYSPHORIA: Primary | ICD-10-CM

## 2024-05-21 PROCEDURE — 99214 OFFICE O/P EST MOD 30 MIN: CPT | Performed by: NURSE PRACTITIONER

## 2024-05-21 NOTE — PROGRESS NOTES
GENDER CARE POST-OP     HISTORY OF PRESENT ILLNESS:   Herb Mcdonough is a 38 y.o. trans man s/p metoidioplasty with UL 12/28/23    INTERVAL HISTORY:  Seen with spouse Yanet for POV  Only issue is tightness in perineum  Most noticeable with walking or sitting on hard surface  Area of dehiscence healed to shaft ?loss of length; looks like labia minora  If he pulls back tip, appearance improved  Feels overall like it still looks like a vagina  Spraying with void due to open meatus; hoping this can be fixed  Still somewhat hopeful that he might be able to pee standing up  Just got over COVID and then another ?viral infection  Completely quit vaping x 1 month    An interactive audio and video telecommunication system which permits real time communications between the patient (at the originating site) and provider (at the distant site) was utilized to provide this telehealth service.   Verbal consent was requested and obtained from Sofie Mcdonough on this date, 05/21/24 for a telehealth visit.       PAST MEDICAL HISTORY:  Past Medical History:   Diagnosis Date    Anxiety     Drug use     Gender dysphoria        PAST SURGICAL HISTORY:  Past Surgical History:   Procedure Laterality Date    ANTERIOR CERVICAL DISCECTOMY W/ FUSION      x4: 11/3/17, 8/23/19 and others    APPENDECTOMY      BREAST SURGERY  04/18/2010    reduction    CHOLECYSTECTOMY      laparoscopic    HIP FRACTURE SURGERY Right     HYSTERECTOMY      MASTECTOMY Bilateral 01/2013    NECK SURGERY      x2    PENIS SURGERY  12/28/2023    METOIDiOPLASTY WITH URETHRAL LENGTHENING    TOTAL ABDOMINAL HYSTERECTOMY W/ BILATERAL SALPINGOOPHORECTOMY  07/12/2011        ALLERGIES:   Allergies   Allergen Reactions    Metronidazole Anaphylaxis    Trazodone Anaphylaxis, Headache, Nausea/vomiting and Syncope    Vancomycin Anaphylaxis, Shortness of breath and Swelling     Red man syndrome    Bee Venom Protein (Honey Bee) Swelling    Morphine Headache and Nausea/vomiting    Prochlorperazine  "Anxiety     \"skin crawling \"    Sumatriptan Headache and Rash    Peanut Oil Nausea/vomiting    Haloperidol Anxiety     \"skin crawling \"    Latex, Natural Rubber Hives and Rash    Metoclopramide Anxiety, Headache and Nausea Only        MEDICATIONS:     Current Outpatient Medications:     ibuprofen 800 mg tablet, Take 1 tablet (800 mg) by mouth every 8 hours if needed for moderate pain (4 - 6) or mild pain (1 - 3)., Disp: 60 tablet, Rfl: 0    ketorolac (Toradol) 10 mg tablet, 1-2 tablets every 6 hours as needed for severe pain NTE 4 tablets per day., Disp: 20 tablet, Rfl: 0    methadone (Dolophine) 10 mg/mL solution, Take 9.7 mL (97 mg) by mouth once daily., Disp: , Rfl:     oxyCODONE-acetaminophen (Percocet) 5-325 mg tablet, Take 1 tablet by mouth every 6 hours if needed for severe pain (7 - 10)., Disp: 5 tablet, Rfl: 0    testosterone cypionate (Depo-Testosterone) 200 mg/mL injection, Inject 0.6 mL (120 mg) into the muscle 1 (one) time per week., Disp: , Rfl:      SOCIAL HISTORY:  Patient  reports that he has quit smoking. His smoking use included cigarettes. He has a 3.4 pack-year smoking history. He uses smokeless tobacco. He reports that he does not currently use alcohol. He reports that he does not currently use drugs.   Social History     Socioeconomic History    Marital status:      Spouse name: Not on file    Number of children: Not on file    Years of education: Not on file    Highest education level: Not on file   Occupational History    Not on file   Tobacco Use    Smoking status: Former     Current packs/day: 0.20     Average packs/day: 0.2 packs/day for 17.0 years (3.4 ttl pk-yrs)     Types: Cigarettes    Smokeless tobacco: Current    Tobacco comments:     Tobaccoless vaping   Vaping Use    Vaping status: Every Day   Substance and Sexual Activity    Alcohol use: Not Currently    Drug use: Not Currently     Comment: hx of abuse: opioids, inhalant, fentanyl    Sexual activity: Not on file   Other " Topics Concern    Not on file   Social History Narrative    Not on file     Social Determinants of Health     Financial Resource Strain: Not on File (5/13/2020)    Received from [facility]     Financial Resource Strain     Financial Resource Strain: 0   Food Insecurity: No Food Insecurity (11/5/2023)    Received from [facility]    [facility] Brief Food     Brief social Worry about food: Not on file     Brief social Food run out: Not on file   Transportation Needs: No Transportation Needs (11/5/2023)    Received from [facility]    [facility] Brief Transportation     Brief social Transport to appt: Not on file     Brief social Transport to work: Not on file   Physical Activity: Not on File (5/13/2020)    Received from [facility]     Physical Activity     Physical Activity: 0   Stress: Not on File (5/13/2020)    Received from [facility]     Stress     Stress: 0   Social Connections: Not on File (5/13/2020)    Received from [facility]     Social Connections     Social Connections and Isolation: 0   Intimate Partner Violence: Not on file   Housing Stability: Low Risk  (11/5/2023)    Received from [facility]    [facility] Brief Housing     Brief social Pay for mortgage/rent: Not on file     Brief social: Place to sleep: Not on file       FAMILY HISTORY:  Family History   Problem Relation Name Age of Onset    Heart disease Mother      Depression Father      Diabetes Father      Hypertension Father      Other (substance abuse) Father      Depression Brother      Ovarian cancer Maternal Grandmother      Cancer Paternal Grandmother      Diabetes Paternal Grandfather      Kidney disease Paternal Grandfather         REVIEW OF SYSTEMS:  All systems reviewed, pertinent negatives as noted in HPI.    PHYSICAL EXAM:  There were no vitals taken for this visit.    Constitutional: Well-developed and well-nourished. No distress.    Head: Normocephalic and atraumatic.    Neck: Normal range of motion.    Pulmonary/Chest: Effort  normal. No respiratory distress.   Musculoskeletal: Normal range of motion.    Neurological: Alert and oriented.  Psychiatric: Normal mood and affect. Thought content normal.      LABORATORY REVIEW:   Lab Results   Component Value Date    BUN 9 12/29/2023    CREATININE 1.04 12/29/2023    EGFR 71 12/29/2023     12/29/2023    K 3.7 12/29/2023     12/29/2023    CO2 27 12/29/2023    CALCIUM 8.0 (L) 12/29/2023      Lab Results   Component Value Date    WBC 14.3 (H) 12/29/2023    RBC 4.40 12/29/2023    HGB 13.3 12/29/2023    HCT 40.2 12/29/2023    MCV 91 12/29/2023    MCH 30.2 12/29/2023    MCHC 33.1 12/29/2023    RDW 13.1 12/29/2023     12/29/2023               Assessment:     Encounter Diagnosis   Name Primary?    Gender dysphoria Yes       Herb Mcdonough is a 38 y.o. trans man s/p metoidioplasty with UL on 12/28/23  Several concerns as noted above     Plan:   Plan for Stage 2 before August 20 or December; will have team reach out to schedule  Would like tethering of shaft, open meatus revised if possible  RTC for exam with Dr. Ramirez prior to stage 2  Encouraged to call in the interim with any questions, concerns

## 2024-05-28 DIAGNOSIS — F64.9 GENDER DYSPHORIA: ICD-10-CM

## 2024-05-29 ENCOUNTER — APPOINTMENT (OUTPATIENT)
Dept: UROLOGY | Facility: CLINIC | Age: 39
End: 2024-05-29
Payer: COMMERCIAL

## 2024-05-30 ENCOUNTER — HOSPITAL ENCOUNTER (OUTPATIENT)
Facility: HOSPITAL | Age: 39
Setting detail: OUTPATIENT SURGERY
End: 2024-05-30
Attending: UROLOGY | Admitting: UROLOGY
Payer: COMMERCIAL

## 2024-05-30 RX ORDER — ACETAMINOPHEN 325 MG/1
975 TABLET ORAL ONCE
OUTPATIENT
Start: 2024-05-30 | End: 2024-05-30

## 2024-05-30 RX ORDER — CELECOXIB 400 MG/1
400 CAPSULE ORAL ONCE
OUTPATIENT
Start: 2024-05-30 | End: 2024-05-30

## 2024-05-30 RX ORDER — SODIUM CHLORIDE 9 MG/ML
100 INJECTION, SOLUTION INTRAVENOUS CONTINUOUS
OUTPATIENT
Start: 2024-05-30

## 2024-05-31 ENCOUNTER — TELEPHONE (OUTPATIENT)
Dept: UROLOGY | Facility: CLINIC | Age: 39
End: 2024-05-31
Payer: COMMERCIAL

## 2024-05-31 NOTE — TELEPHONE ENCOUNTER
Returning patient's call. Patient informed that he will need to be seen in person prior to his next surgery. Also informed pt that an updated letter of support is needed before the prior authorization process can begin. Phone number for behavioral health scheduling was provided. Message sent to Charanjit Vang to update on need of letter and time sensitivity.

## 2024-06-07 DIAGNOSIS — Z01.818 PREOPERATIVE CLEARANCE: ICD-10-CM

## 2024-06-12 ENCOUNTER — APPOINTMENT (OUTPATIENT)
Dept: UROLOGY | Facility: CLINIC | Age: 39
End: 2024-06-12
Payer: COMMERCIAL

## 2024-07-01 ENCOUNTER — APPOINTMENT (OUTPATIENT)
Dept: BEHAVIORAL HEALTH | Facility: CLINIC | Age: 39
End: 2024-07-01
Payer: COMMERCIAL

## 2024-07-03 ENCOUNTER — TELEPHONE (OUTPATIENT)
Dept: UROLOGY | Facility: CLINIC | Age: 39
End: 2024-07-03
Payer: COMMERCIAL

## 2024-07-03 NOTE — TELEPHONE ENCOUNTER
Following up with pt concerning second letter of support. Pt states that provider rescheduled appt from 7/1 to 7/25. Pt is going to contact provider and attempt to get rescheduled for an earlier appt. Will follow up.

## 2024-07-15 ENCOUNTER — APPOINTMENT (OUTPATIENT)
Dept: UROLOGY | Facility: CLINIC | Age: 39
End: 2024-07-15
Payer: COMMERCIAL

## 2024-07-25 ENCOUNTER — APPOINTMENT (OUTPATIENT)
Dept: BEHAVIORAL HEALTH | Facility: CLINIC | Age: 39
End: 2024-07-25
Payer: COMMERCIAL

## 2024-07-25 DIAGNOSIS — F64.9 GENDER DYSPHORIA: ICD-10-CM

## 2024-07-25 PROCEDURE — 99214 OFFICE O/P EST MOD 30 MIN: CPT | Performed by: NURSE PRACTITIONER
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
## 2024-08-16 ENCOUNTER — TELEPHONE (OUTPATIENT)
Dept: UROLOGY | Facility: CLINIC | Age: 39
End: 2024-08-16
Payer: COMMERCIAL

## 2024-08-16 NOTE — TELEPHONE ENCOUNTER
Spoke with patient concerning in-person preop appt scheduled for 8/19. Pt states he may not be able to arrange transportation. Confirmed he had phone number to clinic ([phone]). Asked pt to call office on 8/19 as soon as he knew for sure whether he would be able to make appt. Will reschedule as availability allows. Pt must be seen in-person prior to upcoming surgery. No other needs at this time.

## 2024-08-19 ENCOUNTER — APPOINTMENT (OUTPATIENT)
Dept: UROLOGY | Facility: CLINIC | Age: 39
End: 2024-08-19
Payer: COMMERCIAL

## 2024-09-03 ENCOUNTER — TELEPHONE (OUTPATIENT)
Dept: UROLOGY | Facility: CLINIC | Age: 39
End: 2024-09-03
Payer: COMMERCIAL

## 2024-09-03 NOTE — TELEPHONE ENCOUNTER
Spoke with pt. Pt states he will be able to attend his preop appt. He is also aware that preop lab orders have been submitted and he will go to outpatient lab directly after preop appt. No other needs at this time.

## 2024-09-09 ENCOUNTER — APPOINTMENT (OUTPATIENT)
Dept: UROLOGY | Facility: CLINIC | Age: 39
End: 2024-09-09
Payer: COMMERCIAL

## 2024-09-16 ENCOUNTER — APPOINTMENT (OUTPATIENT)
Dept: UROLOGY | Facility: CLINIC | Age: 39
End: 2024-09-16
Payer: COMMERCIAL

## 2024-10-03 DIAGNOSIS — Z01.818 PREOPERATIVE CLEARANCE: ICD-10-CM

## 2024-10-14 ENCOUNTER — LAB (OUTPATIENT)
Dept: LAB | Facility: LAB | Age: 39
End: 2024-10-14
Payer: COMMERCIAL

## 2024-10-14 ENCOUNTER — APPOINTMENT (OUTPATIENT)
Dept: UROLOGY | Facility: CLINIC | Age: 39
End: 2024-10-14
Payer: COMMERCIAL

## 2024-10-14 VITALS — TEMPERATURE: 97.5 F | SYSTOLIC BLOOD PRESSURE: 139 MMHG | DIASTOLIC BLOOD PRESSURE: 88 MMHG | HEART RATE: 84 BPM

## 2024-10-14 DIAGNOSIS — F64.9 GENDER DYSPHORIA: Primary | ICD-10-CM

## 2024-10-14 DIAGNOSIS — Z01.818 PREOPERATIVE CLEARANCE: ICD-10-CM

## 2024-10-14 LAB
ANION GAP SERPL CALC-SCNC: 12 MMOL/L (ref 10–20)
BUN SERPL-MCNC: 8 MG/DL (ref 6–23)
CALCIUM SERPL-MCNC: 9.3 MG/DL (ref 8.6–10.3)
CHLORIDE SERPL-SCNC: 99 MMOL/L (ref 98–107)
CO2 SERPL-SCNC: 31 MMOL/L (ref 21–32)
CREAT SERPL-MCNC: 1.09 MG/DL (ref 0.5–1.3)
EGFRCR SERPLBLD CKD-EPI 2021: 66 ML/MIN/1.73M*2
ERYTHROCYTE [DISTWIDTH] IN BLOOD BY AUTOMATED COUNT: 13.5 % (ref 11.5–14.5)
GLUCOSE SERPL-MCNC: 55 MG/DL (ref 74–99)
HCT VFR BLD AUTO: 50.8 % (ref 36–52)
HGB BLD-MCNC: 16.6 G/DL (ref 12–17.5)
MCH RBC QN AUTO: 29.1 PG (ref 26–34)
MCHC RBC AUTO-ENTMCNC: 32.7 G/DL (ref 32–36)
MCV RBC AUTO: 89 FL (ref 80–100)
NRBC BLD-RTO: 0 /100 WBCS (ref 0–0)
PLATELET # BLD AUTO: 250 X10*3/UL (ref 150–450)
POTASSIUM SERPL-SCNC: 3.7 MMOL/L (ref 3.5–5.3)
RBC # BLD AUTO: 5.71 X10*6/UL (ref 4–5.9)
RBC #/AREA URNS AUTO: ABNORMAL /HPF
SODIUM SERPL-SCNC: 138 MMOL/L (ref 136–145)
WBC # BLD AUTO: 9.2 X10*3/UL (ref 4.4–11.3)
WBC #/AREA URNS AUTO: ABNORMAL /HPF

## 2024-10-14 PROCEDURE — 85027 COMPLETE CBC AUTOMATED: CPT

## 2024-10-14 PROCEDURE — 99213 OFFICE O/P EST LOW 20 MIN: CPT | Performed by: NURSE PRACTITIONER

## 2024-10-14 PROCEDURE — 81001 URINALYSIS AUTO W/SCOPE: CPT

## 2024-10-14 PROCEDURE — 80048 BASIC METABOLIC PNL TOTAL CA: CPT

## 2024-10-14 NOTE — PROGRESS NOTES
"UROLOGIC FOLLOW-UP VISIT     PROBLEM LIST:  No diagnosis found.     HISTORY OF PRESENT ILLNESS:   Sofie Mcdonough is a 39 y.o. transmale who is s/p metoidioplasty with UL 12/28/23 who presents prior to stage 2.     Patient reports that he struggles with some spraying of urine which is causing dysphoria. Also reports a \"crevice\" at the top of his scrotoplasty which collects sweat and urine. Otherwise doing well.     PAST MEDICAL HISTORY:  Past Medical History:   Diagnosis Date    Anxiety     Drug use     Gender dysphoria        PAST SURGICAL HISTORY:  Past Surgical History:   Procedure Laterality Date    ANTERIOR CERVICAL DISCECTOMY W/ FUSION      x4: 11/3/17, 8/23/19 and others    APPENDECTOMY      BREAST SURGERY  04/18/2010    reduction    CHOLECYSTECTOMY      laparoscopic    HIP FRACTURE SURGERY Right     HYSTERECTOMY      MASTECTOMY Bilateral 01/2013    NECK SURGERY      x2    PENIS SURGERY  12/28/2023    METOIDiOPLASTY WITH URETHRAL LENGTHENING    TOTAL ABDOMINAL HYSTERECTOMY W/ BILATERAL SALPINGOOPHORECTOMY  07/12/2011        ALLERGIES:   Allergies   Allergen Reactions    Metronidazole Anaphylaxis    Trazodone Anaphylaxis, Headache, Nausea/vomiting and Syncope    Vancomycin Anaphylaxis, Shortness of breath and Swelling     Red man syndrome    Bee Venom Protein (Honey Bee) Swelling    Morphine Headache and Nausea/vomiting    Prochlorperazine Anxiety     \"skin crawling \"    Sumatriptan Headache and Rash    Peanut Oil Nausea/vomiting    Haloperidol Anxiety     \"skin crawling \"    Latex, Natural Rubber Hives and Rash    Metoclopramide Anxiety, Headache and Nausea Only        MEDICATIONS:   Current Outpatient Medications on File Prior to Visit   Medication Sig Dispense Refill    methadone (Dolophine) 10 mg/mL solution Take 9.7 mL (97 mg) by mouth once daily.      testosterone cypionate (Depo-Testosterone) 200 mg/mL injection Inject 0.6 mL (120 mg) into the muscle 1 (one) time per week.      ibuprofen 800 mg tablet " Take 1 tablet (800 mg) by mouth every 8 hours if needed for moderate pain (4 - 6) or mild pain (1 - 3). (Patient not taking: Reported on 10/14/2024) 60 tablet 0     No current facility-administered medications on file prior to visit.        SOCIAL HISTORY:  Patient  reports that he has quit smoking. His smoking use included cigarettes. He has a 3.4 pack-year smoking history. He uses smokeless tobacco. He reports that he does not currently use alcohol. He reports that he does not currently use drugs.   Social History     Socioeconomic History    Marital status:      Spouse name: Not on file    Number of children: Not on file    Years of education: Not on file    Highest education level: Not on file   Occupational History    Not on file   Tobacco Use    Smoking status: Former     Current packs/day: 0.20     Average packs/day: 0.2 packs/day for 17.0 years (3.4 ttl pk-yrs)     Types: Cigarettes    Smokeless tobacco: Current    Tobacco comments:     Tobaccoless vaping   Vaping Use    Vaping status: Every Day   Substance and Sexual Activity    Alcohol use: Not Currently    Drug use: Not Currently     Comment: hx of abuse: opioids, inhalant, fentanyl    Sexual activity: Not on file   Other Topics Concern    Not on file   Social History Narrative    Not on file     Social Determinants of Health     Financial Resource Strain: Not on File (5/13/2020)    Received from [facility]    Financial Resource Strain     Financial Resource Strain: 0   Food Insecurity: No Food Insecurity (11/5/2023)    Received from [facility]    [facility] Food     Brief social Worry about food: Not on file     Brief social Food run out: Not on file   Transportation Needs: No Transportation Needs (11/5/2023)    Received from [facility]    [facility] Transportation     Brief social Transport to appt: Not on file     Brief social Transport to work: Not on file   Physical Activity: Not on File (5/13/2020)    Received  from [facility]    Physical Activity     Physical Activity: 0   Stress: Not on File (5/13/2020)    Received from [facility]    Stress     Stress: 0   Social Connections: Not on File (5/13/2020)    Received from [facility]    Social Connections     Social Connections and Isolation: 0   Intimate Partner Violence: Not on file   Housing Stability: Low Risk  (11/5/2023)    Received from [facility]    [facility] Housing     Brief social Pay for mortgage/rent: Not on file     Brief social: Place to sleep: Not on file       FAMILY HISTORY:  Family History   Problem Relation Name Age of Onset    Heart disease Mother      Depression Father      Diabetes Father      Hypertension Father      Other (substance abuse) Father      Depression Brother      Ovarian cancer Maternal Grandmother      Cancer Paternal Grandmother      Diabetes Paternal Grandfather      Kidney disease Paternal Grandfather         REVIEW OF SYSTEMS:  Negative except as reported above    PHYSICAL EXAM:  Visit Vitals  /88   Pulse 84   Temp 36.4 °C (97.5 °F) (Temporal)     Constitutional: Well-developed and well-nourished. No distress.    Head: Normocephalic and atraumatic.    Neck: Normal range of motion.     Pulmonary/Chest: Effort normal. No respiratory distress.   Abdominal: Non-distended.  : well-healed metoidioplasty with hypospadiac meatus, small divot at top of scrotoplasty incision  Integumentary: No rash or lesions visualized.  Musculoskeletal: Normal range of motion.    Neurological: Alert and oriented.  Psychiatric: Normal mood and affect. Thought content normal.      LABORATORY REVIEW:   Lab Results   Component Value Date    BUN 9 12/29/2023    CREATININE 1.04 12/29/2023    EGFR 71 12/29/2023     12/29/2023    K 3.7 12/29/2023     12/29/2023    CO2 27 12/29/2023    CALCIUM 8.0 (L) 12/29/2023      Lab Results   Component Value Date    WBC 14.3 (H) 12/29/2023    RBC 4.40 12/29/2023    HGB 13.3 12/29/2023    HCT  "40.2 12/29/2023    MCV 91 12/29/2023    MCH 30.2 12/29/2023    MCHC 33.1 12/29/2023    RDW 13.1 12/29/2023     12/29/2023        No results found for: \"PSA\"          Assessment:   Sofie Mcdonough is a 39 y.o. transmale who is s/p metoidioplasty with UL 12/28/23 who presents prior to stage 2.      Plan:   We had an extensive and halina discussion regarding his surgical expectations. We discussed that we will size the implants appropriately on the day of surgery and will attempt to expose as much of the clitoral shaft as possible with the monsplasty though his expectations should be tempered given his elevated BMI. The divot at the top of the scrotoplasty incision will be difficult to fix and the risks would outweigh the benefits. The urinary spray is difficult to address without conversion to phalloplasty which patient is not interested in at this time. Will plan to proceed as scheduled on 11/14.         "

## 2024-10-14 NOTE — PROGRESS NOTES
[facility] POST-OP     HISTORY OF PRESENT ILLNESS:   Herb Mcdonough is a 39 y.o. trans man s/p metoidioplasty with UL 12/28/23    INTERVAL HISTORY:  Seen with spouse Yanet for POV  Only issue is tightness in perineum  Most noticeable with walking or sitting on hard surface  Area of dehiscence healed to shaft ?loss of length; looks like labia minora  If he pulls back tip, appearance improved  Feels overall like it still looks like a vagina  Spraying with void due to open meatus; hoping this can be fixed  Still somewhat hopeful that he might be able to pee standing up  Just got over COVID and then another ?viral infection  Completely quit vaping x 1 month    An interactive audio and video telecommunication system which permits real time communications between the patient (at the originating site) and provider (at the distant site) was utilized to provide this telehealth service.   Verbal consent was requested and obtained from Sofie Mcdonough on this date, 10/14/24 for a telehealth visit.       PAST MEDICAL HISTORY:  Past Medical History:   Diagnosis Date    Anxiety     Drug use     Gender dysphoria        PAST SURGICAL HISTORY:  Past Surgical History:   Procedure Laterality Date    ANTERIOR CERVICAL DISCECTOMY W/ FUSION      x4: 11/3/17, 8/23/19 and others    APPENDECTOMY      BREAST SURGERY  04/18/2010    reduction    CHOLECYSTECTOMY      laparoscopic    HIP FRACTURE SURGERY Right     HYSTERECTOMY      MASTECTOMY Bilateral 01/2013    NECK SURGERY      x2    PENIS SURGERY  12/28/2023    METOIDiOPLASTY WITH URETHRAL LENGTHENING    TOTAL ABDOMINAL HYSTERECTOMY W/ BILATERAL SALPINGOOPHORECTOMY  07/12/2011        ALLERGIES:   Allergies   Allergen Reactions    Metronidazole Anaphylaxis    Trazodone Anaphylaxis, Headache, Nausea/vomiting and Syncope    Vancomycin Anaphylaxis, Shortness of breath and Swelling     Red man syndrome    Bee Venom Protein (Honey Bee) Swelling    Morphine Headache and Nausea/vomiting    Prochlorperazine  "Anxiety     \"skin crawling \"    Sumatriptan Headache and Rash    Peanut Oil Nausea/vomiting    Haloperidol Anxiety     \"skin crawling \"    Latex, Natural Rubber Hives and Rash    Metoclopramide Anxiety, Headache and Nausea Only        MEDICATIONS:     Current Outpatient Medications:     ibuprofen 800 mg tablet, Take 1 tablet (800 mg) by mouth every 8 hours if needed for moderate pain (4 - 6) or mild pain (1 - 3)., Disp: 60 tablet, Rfl: 0    methadone (Dolophine) 10 mg/mL solution, Take 9.7 mL (97 mg) by mouth once daily., Disp: , Rfl:     testosterone cypionate (Depo-Testosterone) 200 mg/mL injection, Inject 0.6 mL (120 mg) into the muscle 1 (one) time per week., Disp: , Rfl:      SOCIAL HISTORY:  Patient  reports that he has quit smoking. His smoking use included cigarettes. He has a 3.4 pack-year smoking history. He uses smokeless tobacco. He reports that he does not currently use alcohol. He reports that he does not currently use drugs.   Social History     Socioeconomic History    Marital status:      Spouse name: Not on file    Number of children: Not on file    Years of education: Not on file    Highest education level: Not on file   Occupational History    Not on file   Tobacco Use    Smoking status: Former     Current packs/day: 0.20     Average packs/day: 0.2 packs/day for 17.0 years (3.4 ttl pk-yrs)     Types: Cigarettes    Smokeless tobacco: Current    Tobacco comments:     Tobaccoless vaping   Vaping Use    Vaping status: Every Day   Substance and Sexual Activity    Alcohol use: Not Currently    Drug use: Not Currently     Comment: hx of abuse: opioids, inhalant, fentanyl    Sexual activity: Not on file   Other Topics Concern    Not on file   Social History Narrative    Not on file     Social Determinants of Health     Financial Resource Strain: Not on File (5/13/2020)    Received from [facility]    Financial Resource Strain     Financial Resource Strain: 0   Food Insecurity: No Food Insecurity " (11/5/2023)    Received from [facility]    [facility] Food     Brief social Worry about food: Not on file     Brief social Food run out: Not on file   Transportation Needs: No Transportation Needs (11/5/2023)    Received from [employer]    [facility] Brief Transportation     Brief social Transport to appt: Not on file     Brief social Transport to work: Not on file   Physical Activity: Not on File (5/13/2020)    Received from [facility]    Physical Activity     Physical Activity: 0   Stress: Not on File (5/13/2020)    Received from [facility]    Stress     Stress: 0   Social Connections: Not on File (5/13/2020)    Received from [employer]    Social Connections     Social Connections and Isolation: 0   Intimate Partner Violence: Not on file   Housing Stability: Low Risk  (11/5/2023)    Received from [facility]    [facility] Brief Housing     Brief social Pay for mortgage/rent: Not on file     Brief social: Place to sleep: Not on file       FAMILY HISTORY:  Family History   Problem Relation Name Age of Onset    Heart disease Mother      Depression Father      Diabetes Father      Hypertension Father      Other (substance abuse) Father      Depression Brother      Ovarian cancer Maternal Grandmother      Cancer Paternal Grandmother      Diabetes Paternal Grandfather      Kidney disease Paternal Grandfather         REVIEW OF SYSTEMS:  All systems reviewed, pertinent negatives as noted in HPI.    PHYSICAL EXAM:  There were no vitals taken for this visit.    Constitutional: Well-developed and well-nourished. No distress.    Head: Normocephalic and atraumatic.    Neck: Normal range of motion.    Pulmonary/Chest: Effort normal. No respiratory distress.   Musculoskeletal: Normal range of motion.    Neurological: Alert and oriented.  Psychiatric: Normal mood and affect. Thought content normal.      LABORATORY REVIEW:   Lab Results   Component Value Date    BUN 9 12/29/2023    CREATININE  1.04 12/29/2023    EGFR 71 12/29/2023     12/29/2023    K 3.7 12/29/2023     12/29/2023    CO2 27 12/29/2023    CALCIUM 8.0 (L) 12/29/2023      Lab Results   Component Value Date    WBC 14.3 (H) 12/29/2023    RBC 4.40 12/29/2023    HGB 13.3 12/29/2023    HCT 40.2 12/29/2023    MCV 91 12/29/2023    MCH 30.2 12/29/2023    MCHC 33.1 12/29/2023    RDW 13.1 12/29/2023     12/29/2023               Assessment:     No diagnosis found.      Herb Mcdonough is a 38 y.o. trans man s/p metoidioplasty with UL on 12/28/23  Several concerns as noted above     Plan:   Plan for Stage 2 before August 20 or December; will have team reach out to schedule  Would like tethering of shaft, open meatus revised if possible  RTC for exam with Dr. Ramirez prior to stage 2  Encouraged to call in the interim with any questions, concerns

## 2024-10-18 LAB
ANABASINE UR-MCNC: <5 NG/ML
COTININE UR-MCNC: <15 NG/ML
NICOTINE UR-MCNC: <15 NG/ML
TRANS-3-OH-COTININE UR-MCNC: <50 NG/ML

## 2024-10-19 ENCOUNTER — PATIENT MESSAGE (OUTPATIENT)
Dept: UROLOGY | Facility: CLINIC | Age: 39
End: 2024-10-19
Payer: COMMERCIAL

## 2024-10-19 DIAGNOSIS — E66.9 OBESITY (BMI 30-39.9): Primary | ICD-10-CM

## 2024-10-24 ENCOUNTER — TELEPHONE (OUTPATIENT)
Dept: UROLOGY | Facility: CLINIC | Age: 39
End: 2024-10-24
Payer: COMMERCIAL

## 2024-10-24 NOTE — TELEPHONE ENCOUNTER
Pre-Op Phone Call    Verified patient by name and date of birth. Done    Verified procedure and DOS. Done    Verified patient is planning for Outpatient Done    Verified letters of support in chart. Dates: 12.6.24 & 7.25.24 Done    Verified known drug allergies and updated as needed. Done    Verified medication list and updated as needed. Done    Verified pharmacy and updated as needed. Done    Surgical History Previous urological reconstruction    Anesthesia Yes, general with no issues    Blood Transfusion Never and OK if needed    Nicotine Quit >1 year    Drug Use Previous other drug use    ETOH None    Social Support Partner and Friend(s)    Living Accommodations House, Stairs, patient advised to limit stairs. and Primary living space all on one level    Financial Support Disability    Verified preop labs are ordered or resulted. Patient is using  Lab    Submitted prescriptions for preop prep of Chlorhexidine    Verified first follow-up visit is scheduled. Date: 11.21 Done    Addressed patient's questions. Done    Encouraged patient to contact the team with any other questions or concerns. Done

## 2024-10-28 ENCOUNTER — APPOINTMENT (OUTPATIENT)
Dept: UROLOGY | Facility: CLINIC | Age: 39
End: 2024-10-28
Payer: COMMERCIAL

## 2024-10-28 DIAGNOSIS — F64.9 GENDER DYSPHORIA: Primary | ICD-10-CM

## 2024-10-28 DIAGNOSIS — E66.9 OBESITY (BMI 30-39.9): ICD-10-CM

## 2024-10-28 PROCEDURE — 99214 OFFICE O/P EST MOD 30 MIN: CPT | Performed by: UROLOGY

## 2024-11-11 ENCOUNTER — APPOINTMENT (OUTPATIENT)
Dept: UROLOGY | Facility: CLINIC | Age: 39
End: 2024-11-11
Payer: COMMERCIAL

## 2024-11-14 ENCOUNTER — APPOINTMENT (OUTPATIENT)
Dept: UROLOGY | Facility: HOSPITAL | Age: 39
End: 2024-11-14
Payer: COMMERCIAL

## 2024-11-14 DIAGNOSIS — F64.9 GENDER DYSPHORIA: Primary | ICD-10-CM

## 2024-11-14 PROCEDURE — 90791 PSYCH DIAGNOSTIC EVALUATION: CPT | Performed by: PSYCHOLOGIST

## 2024-11-21 ENCOUNTER — APPOINTMENT (OUTPATIENT)
Dept: UROLOGY | Facility: CLINIC | Age: 39
End: 2024-11-21
Payer: COMMERCIAL

## 2024-11-25 ENCOUNTER — APPOINTMENT (OUTPATIENT)
Dept: UROLOGY | Facility: CLINIC | Age: 39
End: 2024-11-25
Payer: COMMERCIAL

## 2024-11-25 VITALS
TEMPERATURE: 97.8 F | WEIGHT: 280 LBS | HEART RATE: 84 BPM | DIASTOLIC BLOOD PRESSURE: 88 MMHG | BODY MASS INDEX: 43.95 KG/M2 | SYSTOLIC BLOOD PRESSURE: 144 MMHG | HEIGHT: 67 IN

## 2024-11-25 DIAGNOSIS — F64.9 GENDER DYSPHORIA: Primary | ICD-10-CM

## 2024-11-25 PROCEDURE — G2211 COMPLEX E/M VISIT ADD ON: HCPCS | Performed by: UROLOGY

## 2024-11-25 PROCEDURE — 3008F BODY MASS INDEX DOCD: CPT | Performed by: UROLOGY

## 2024-11-25 PROCEDURE — 99215 OFFICE O/P EST HI 40 MIN: CPT | Performed by: UROLOGY

## 2024-11-25 ASSESSMENT — PAIN SCALES - GENERAL: PAINLEVEL_OUTOF10: 0-NO PAIN

## 2024-11-25 NOTE — PROGRESS NOTES
S/p metoidioplasty  Decided not to proceed with stage 2 (implants/mons reduction) because he wanst to proceed with phalloplasty  Phallo will be left RFFF w/ urethra      Presents for pre-op planning and discussion regarding phalloplasty.    Examination:  - Hand  strength: normal bilaterally  - Multiple superficial transverse scars noted on forearm, all from teenage years  - Blood flow to forearm assessed: good inflow, potential compromised outflow due to scarring    Discussion and Plan:  - Stage 0 delay procedure recommended due to scarring: partial flap elevation followed by 6-8 weeks healing before main surgery  - Hair removal required for urethral area, estimated 9 months duration with 4-6 weeks between sessions  - Weight loss required before surgery  - Currently pursuing Ozempic prescription: insurance denial under appeal  - Exploring alternative weight management clinics  - Forearm flap size and length discussed, limited by pedicle  - Malleable and pump prosthetic options confirmed  - Surgery timing:  - Stage 0 (outpatient) planned for June 2024  - Stage 1 planned for JuL/Aug 2024      Follow up:  Review in 3 months to assess:  - Weight loss progress  - Hair removal progress  - Finalise surgical dates

## 2024-11-25 NOTE — PROGRESS NOTES
"Outpatient Psychology Initial Appointment  Subjective   Sofie Mcdonough \"Herb\", a 39 y.o. adult, for initial evaluation visit. Participated in diagnostic interview and identification of goals for treatment.      Patient is referred by  [facility].      Reason:  He has been experiencing ongoing gender dysphoria.      Psychosocial History Herb is currently presenting for psychological services with in an effort to gain a letter of support for gender affirmative surgical care.  Herb states that he is currently in the process of changing directions on type of surgery he is having and is in need of an updated letter due to the timeline lending to his prior letters expiring.  According to Herb, he initially was going through a metoidioplasty and was ready to do a second stage.  However, he has decided that a phalloplasty is more in line with overall goals to address the dysphoria he experiences.  He is currently delaying the second stage in order to undergo the phalloplasty.  In the interim, Herb is working to lose some weight in preparation for the procedure.  He is looking to do a radial flap phalloplasty with urethral lengthening.  He was able to identify knowledge of the procedure and any complications that could occur.  Herb's wife has gotten a new job doing direct service for special needs adult care, and Herb is reportedly working with the [employer] for disability due to difficulties and being unemployable.  No other updates since the prior assessment.     Mental Status Evaluation:  Appearance:  age appropriate   Behavior:  normal   Speech:  normal pitch and normal volume   Mood:  normal   Affect:  normal   Thought Process:  normal   Thought Content:  normal   Sensorium:  person, place, time/date, situation, day of week, month of year, and year   Cognition:  grossly intact   Insight:  Intact, as evidenced by ability to express internal thoughts, feelings, and experiences.           Assessment/Plan     Diagnosis:   Patient Active " Problem List   Diagnosis    Opioid use disorder    Gender dysphoria    Obesity (BMI 30-39.9)       Treatment Goals:  Specify outcomes written in observable, behavioral terms:   Alleviate  Gender Dysphoria    Treatment Plan/Recommendations: letter of support to be updated.       Review with patient: Treatment plan reviewed with the patient.    Zachariah Raya PsyD

## 2024-12-06 ENCOUNTER — APPOINTMENT (OUTPATIENT)
Dept: PHARMACY | Facility: HOSPITAL | Age: 39
End: 2024-12-06
Payer: COMMERCIAL

## 2024-12-06 DIAGNOSIS — E66.9 OBESITY (BMI 30-39.9): ICD-10-CM

## 2024-12-06 RX ORDER — DOCUSATE SODIUM 100 MG/1
100 CAPSULE, LIQUID FILLED ORAL DAILY PRN
COMMUNITY

## 2024-12-06 NOTE — PROGRESS NOTES
"  Patient ID: Sofie Mcdonough \"Cam\" is a 39 y.o. adult who presents for weight management    Referring Provider: Mila Smith A*  Pt was referred for weight managment     Preferred Pharmacy:    [facility] [address] [place] [address] [address] [facility]  [address] [facility] [address]  Phone: [phone] Fax: [phone]      Subjective      Movement:   Joint pain? Yeah, currently being evaluated for RA  Osteopenia or osteoporosis: no  Tracker, if so which one? yes  How many steps/day on average? \"Not that many, just got membership, will start working out and walking on treadmill, goal 10.000 steps/day\"  Recommend 7,000 steps per day, 7 days per week. Use a tracker for this such as oura, fit bit, whoop, etc.  If using a tracker:   HRV?   Goal >60  Resting heart rate? 69  Goal <60  Strength training? Just got gym membership  Recommend 2-3x/week for 20 minutes  Consider listening to podcast interviews with Melody George    Stress Level (rate 1-10): 3  No results found for: \"CORTFREE\"    Energy Level (rate 1-10): 6, \"some days more, some days less\"      Sleep:   Quantity/Quality/Regularity? \"Try to, some days are different, sometimes good sleep, sometimes bad, wake up every day at 5:30\"  Screen time? \"Couple hours\"  Daytime brain fog/memory troubles? \"Memory trouble any due to disability\"    Toxins:  Tobacco? no  Alcohol? no  THC/CBD? no  Heating food in plastic?    Plastic water bottles? Tumbler cup, metal    Diet  Took out all pops  Energy drink every few days, sugar free; liquid death  Most of the time water; try to increase hydration  Use airfryer for french fries; haven't had fried food a couple weeks  Like fruits and vegetables   Protein: peanut butter, chicken (reduced red meat)  Carbs: peanut butter with wheat bread, potatoes, pasta  Try smaller meals throughout dinner instead snacking and 1 big meal      Metabolic Body type test: \"metabolic type\"      History of Gout? no  BP Readings from Last " "3 Encounters:   11/25/24 144/88   10/14/24 139/88   01/08/24 124/74     No results found for: \"URICACID\"    If yes or high fructose diet (soda, hfcs, agave, fruit juice, etc.) or high animal based protein diet order uric acid level.   Uric acid levels above 5.5 mg/dL are linked to increased risk of metabolic diseases.   Fructose metabolism generates uric acid, signaling to the body to increase storage of fat.   Uric acid decreases nitric oxide, constricting blood vessels and impairing insulin function.   High sodium intake can increase uric acid by signaling dehydration, but staying hydrated dilutes sodiums effect.   Elevated levels of uric acid increase hunger and impulsivity.     Medications potentially contributing to weight gain:   None identified    Medications tried/stopped for weight management:    no     Weight Loss Drug Therapy Options Screening:     Naltrexone and Bupropion  Binge eating: no  Depression: ptsd  PMH of Suicidal Ideation: no  Avoid use in patients with:  Uncontrolled hypertension: yes  Seizure disorder: no  Eating disorder: no  Use of other bupropion-containing products: no  Chronic opioid use: methadone for 4 years  Avoid use if on opioids. Patients treated with naltrexone may respond to lower opioid doses than previously used. This could result in potentially life-threatening opioid intoxication. Warn patients that any attempt to overcome opioid blockade during naltrexone therapy is dangerous and could potentially lead to fatal opioid overdose.  Within 14 days of monoamine oxidase inhibitors.     GLP-1 and Metformin:     Pre-Diabetes/Diabetes?   No results found for: \"INSULFAST\", \"GLUF\", \"DO7MWHXZ\", \"HGBA1C\", \"LEPTIN\"  No results found for: \"HSCRP\"    Pertinent PMH Review:   PMH of Pancreatitis: no  PMH of Gallbladder disease: no gallbladder, removed due to abdominal pain (later diagnosed as endometriosis)  PMH of Delayed Gastric Emptying: no  GI issues? no  Frequency of BM? daily  PMH of " "MTC: no  PMH of Retinopathy: no    Topiramate:   Migraines? no    Adipex:   Anxiety? Comes from PTSD    Thyroid health:   No results found for: \"TSH\", \"FREET4\", \"T3FREE\", \"T3\", \"THYROIDPAB\"   Thyroid disorders may cause similar symptoms to menopause. Oral HRT and phytoestrogen supplements do not affect normal thyroid function; however, they may impact doses of thyroid medication in women treated for hypothyroidism. HRT given through the skin by gel, patch or spray do not impact thyroid replacement doses however.      Concurrent Chronic Medical Conditions    Cardiovascular Health  The ASCVD Risk score (Ramya BEATTY, et al., 2019) failed to calculate for the following reasons:    The 2019 ASCVD risk score is only valid for ages 40 to 79    Risk score cannot be calculated because patient has a medical history suggesting prior/existing ASCVD  No results found for: \"CHOL\"  No results found for: \"HDL\"  No results found for: \"TRIG\"     No results found for: \"SBP\", \"DBP\"    Iron/B12/Folate Status  No results found for: \"IRON\", \"TIBC\", \"FERRITIN\"   No results found for: \"GTANTSWD89\", \"FOLATE\"    Liver Function  No results found for: \"AST\", \"ALT\", \"GGT\"     Kidney Function  Lab Results   Component Value Date    CREATININE 1.09 10/14/2024       Vitamin D3  No results found for: \"VITD25\"    Current Outpatient Medications on File Prior to Visit   Medication Sig Dispense Refill    ibuprofen 800 mg tablet Take 1 tablet (800 mg) by mouth every 8 hours if needed for moderate pain (4 - 6) or mild pain (1 - 3). (Patient not taking: Reported on 10/14/2024) 60 tablet 0    methadone (Dolophine) 10 mg/mL solution Take 9.7 mL (97 mg) by mouth once daily.      testosterone cypionate (Depo-Testosterone) 200 mg/mL injection Inject 0.6 mL (120 mg) into the muscle 1 (one) time per week.       No current facility-administered medications on file prior to visit.        Lifestyle and Nourishment Recommendations  Focus on whole foods as close to " nature as possible (less than 5 ingredients on the label)  The order of eating matters during each meal, in order to minimize blood sugar spikes  First, 1/3 to 1/2 of meal as colorful vegetables eaten first  Increase fiber 25-30 grams daily (work up slowly to avoid GI distress)  Goal: Daily bowel movement  Second, eat protein and fat as part of your meal  Increase high quality protein to 25-30 grams per meal along with 2-3 x/week resistance training  May consider 3g/day of Creatine (CreaPure) daily mixed in water to support mood, cognition, muscle mass, and bone mass.    Third, have complex carbohydrates/starches as part of your meal  Consider drinking 1 tbsp of apple cider vinegar in a tall glass of water or as a salad dressing with extra virgin olive oil up to 20 minutes prior to or during a meal.   Helps to reduce blood sugar spikes from complex carbohydrates by up to 30%.   Beverages: Focus on filtered water, organic tea (loose leave or in paper, avoid plastic sachets), or sparkling/mineral water (ex. Spindrift, Pritesh)   No more than 1 cup (8oz) of organic coffee daily prior to noon. May consider organic green tea.   Avoid alcohol   Avoid ALL artificial sweeteners   Focus on building muscle- muscle is metabolic tissue  Strength training 2-3 times weekly for at least 20 minutes, max out at 8 reps, rest, repeat x 3.   Walk or move for 10 minutes after each meal - ideally outdoors  Goal: 7,000 steps per day, 7 days per week. Use a tracker for this such as oura, fit bit, whoop, etc.   Manage stress: minimum of 2-5 minutes daily for meditation, minfulness, etc. to reduce cortisol levels.  Prioritize 7-9 hours of restful sleep nightly.   Turn off electronics 1 hour prior to bedtime, no electronics in the bedroom.  Establish a regular sleep/wake time (+/- 30 minutes)     Medication and allergy reconciliation completed     Drug Interactions   No significant drug interactions identified    Assessment/Plan     Patients  goals:   To get surgery within the next year  Lose 35-40 lbs in the next year - lost 4 lbs so far  Discussed patients history, current medical conditions, medications, as well as current diet and lifestyle in depth.   Patient has been making diet/lifestyle changes for: october 2024 - more serious in a few weeks  Current weight: 280 lbs  BMI: 43.85 kg/m² Abnormal  Desired weight: 200 lbs  Concurrent medical conditions: gender dysmorphia  Patient does not have previous history of cardiovascular disease. Advised CAC, JOYCE testing, patient deferred for now.   Lifestyle:   Please review the above lifestyle and nourishment recommendations between now and our next appointment.   Please choose 1-3 items you are not currently doing to implement.   Consider utilizing Cronometer to track dietary intake for 3-5 days.   Consider utilizing a CGM for 14 days.   Blood sugar spikes are often followed by dips, which increase food cravings for the remainder of the day     Patient Assistance program screening    Patient is a potential candidate for Wegovy. Patient states that their medication Wegovy is very expensive for them. Insurance does not cover Wegovy since patient has no history of cardiovascular disease. [facility] offered Wegovy copay card for $650/month and possible enrollment in  Patient Assistance Program to waive the copay.  Discussed  Patient Assistance Program with the patient, however patient states they will not qualify since their geographic location is outside of  Patient Assistance program service location.  Unfortunately, at this time, [facility] does not have other options for the patient.     Time spent with pt: Total length of time 60 (minutes) of the encounter and more than 50% was spent counseling the patient.      Adilene Diego, PharmD   Meds Clinical Pharmacist  Phone: [phone]     Continue all meds under the continuation of care with the referring provider and clinical pharmacy team.    Verbal consent to  manage patient's drug therapy was obtained from the patient and/or an individual authorized to act on behalf of a patient. They were informed they may decline to participate or withdraw from participation in pharmacy services at any time.

## 2024-12-10 DIAGNOSIS — F64.9 GENDER DYSPHORIA: ICD-10-CM

## 2025-02-03 ENCOUNTER — APPOINTMENT (OUTPATIENT)
Dept: PLASTIC SURGERY | Facility: CLINIC | Age: 40
End: 2025-02-03
Payer: COMMERCIAL

## 2025-02-24 ENCOUNTER — APPOINTMENT (OUTPATIENT)
Dept: PLASTIC SURGERY | Facility: CLINIC | Age: 40
End: 2025-02-24
Payer: COMMERCIAL

## 2025-02-24 ENCOUNTER — APPOINTMENT (OUTPATIENT)
Dept: UROLOGY | Facility: CLINIC | Age: 40
End: 2025-02-24
Payer: COMMERCIAL

## 2025-02-25 ENCOUNTER — TELEPHONE (OUTPATIENT)
Dept: UROLOGY | Facility: CLINIC | Age: 40
End: 2025-02-25
Payer: COMMERCIAL

## 2025-02-25 NOTE — TELEPHONE ENCOUNTER
Hi,     Patient called in requesting a letter stating that he has completed all required surgeries for gender reassignment. He is planning to send in the information to update his birth certificate and passport and requested the letter be sent back to his as soon as possible. Patient stated to call him with any questions.     Thank you,

## 2025-03-28 ENCOUNTER — TELEMEDICINE (OUTPATIENT)
Dept: UROLOGY | Facility: CLINIC | Age: 40
End: 2025-03-28
Payer: COMMERCIAL

## 2025-03-28 DIAGNOSIS — F64.9 GENDER DYSPHORIA: Primary | ICD-10-CM

## 2025-03-28 PROCEDURE — G2211 COMPLEX E/M VISIT ADD ON: HCPCS | Performed by: UROLOGY

## 2025-03-28 PROCEDURE — 99214 OFFICE O/P EST MOD 30 MIN: CPT | Performed by: UROLOGY

## 2025-03-28 NOTE — PROGRESS NOTES
"CHIEF COMPLAINT:  Patient presents to the office today to discuss:  1. Weight loss progress and concerns  2. Phalloplasty planning  3. Insurance coverage concerns    PAST UROLOGICAL HISTORY:  39-year-old transgender man with a history of metadioplasty. BMI is 43. Previous appointment in November discussed phalloplasty and weight loss requirements. Target weight set at 230 lbs (BMI goal ~35). Patient reports % disability through the [employer], limiting physical activity to walking only.    HPI TODAY 28/03/2025:  - Reports losing 13-14 pounds since the last visit. Current weight approximately 278 pounds.  - Recent back injury exacerbation, limiting exercise for approximately two months.  - Plans to return to the gym next week.  - Inquired about adjusting weight loss goal to 250-245 pounds; request denied due to surgical safety concerns.  - Appointment scheduled for weight loss medication options.  - Concerned about potential loss of insurance coverage (Medicare/Medicaid) due to spouse's employment.  - Express[employer] worry about potential legislative changes affecting transgender healthcare access.  - Reports mental health impact of gender dysphoria, describing it as \"debilitating at times\".    PHYSICAL EXAMINATION:  No physical exam performed (virtual visit).    ASSESSMENT AND PLAN:  39-year-old transgender man status post metadioplasty, seeking phalloplasty. Currently above target BMI for safe surgery.    1. Obesity (E66.9)  - Assessment: BMI 43, current weight approximately 278 pounds. Target weight 230 pounds for safe surgery. Ideal weight is 224 pounds for his height (5'7\").  - Plan: Continue weight loss efforts. Patient to pursue weight loss medication options.  - Counselling: Discussed the importance of reaching the target weight for surgical safety. Unable to compromise on weight requirement.    2. Gender dysphoria (F64.0)  - Assessment: Ongoing dysphoria, impacting mental health.  - Plan:  1. Stage 0 phalloplasty " (flap lift) to be scheduled once the target weight is reached.  2. Follow-up appointment scheduled for June 2025 with Dr. Aguero and myself to discuss further surgical planning. Will discuss completing metadioplasty if phalloplasty requirements cannot be met.  - Counselling: Discussed potential for completing metadioplasty if unable to meet phalloplasty requirements.    3. Insurance coverage concerns  - Assessment: Patient reports potential loss of Medicaid coverage.  - Plan: Advised patient to monitor insurance status and consider alternative coverage options if needed.    ORDERS:  No orders.    FOLLOW UP:  June 2025 for reassessment of weight and surgical planning.    SHORT SUMMARY:  39-year-old transgender man post-metadioplasty, seeking phalloplasty. Currently above target BMI (43). Plan: Continue weight loss efforts, reassess in June 2025 for potential stage 0 phalloplasty if target weight reached.

## 2025-04-10 ENCOUNTER — TELEPHONE (OUTPATIENT)
Dept: BEHAVIORAL HEALTH | Facility: CLINIC | Age: 40
End: 2025-04-10
Payer: COMMERCIAL

## 2025-06-23 ENCOUNTER — APPOINTMENT (OUTPATIENT)
Dept: UROLOGY | Facility: CLINIC | Age: 40
End: 2025-06-23
Payer: COMMERCIAL

## 2025-06-23 ENCOUNTER — APPOINTMENT (OUTPATIENT)
Dept: PLASTIC SURGERY | Facility: CLINIC | Age: 40
End: 2025-06-23
Payer: COMMERCIAL

## 2025-12-08 ENCOUNTER — APPOINTMENT (OUTPATIENT)
Age: 40
End: 2025-12-08
Payer: COMMERCIAL

## 2025-12-08 ENCOUNTER — APPOINTMENT (OUTPATIENT)
Dept: PLASTIC SURGERY | Facility: CLINIC | Age: 40
End: 2025-12-08
Payer: COMMERCIAL

## (undated) DEVICE — CATHETER, URETHRAL, FOLEY, 2 WAY, BARDEX IC, 14 FR, 5 CC, SILICONE

## (undated) DEVICE — Device

## (undated) DEVICE — STAPLER, SKIN PROXIMATE, 35 WIDE

## (undated) DEVICE — BAG, DRAINAGE, URINARY, W/MONO-FLO ANTI-REFLUX DEVICE, NEEDLESS SAMPLING PORT,SPLASHGUARD II/SAFEGUARD, 2000 CC, STERILE, LF

## (undated) DEVICE — CATHETER, ULTRAMER, CURITY, 16FR

## (undated) DEVICE — TISSEEL FIBRIN SEALANT, PRIMA, FROZEN, 4ML

## (undated) DEVICE — ELECTRODE, ELECTROSURGICAL, GUARDED TIP

## (undated) DEVICE — DRAPE, SHEET, 17 X 23 IN

## (undated) DEVICE — RETRACTOR, CORDLESS, RADIALUX, LIGHTED

## (undated) DEVICE — DRESSING, QUICKCLOT, HEMOSTATIC, 5 X 5

## (undated) DEVICE — SPONGE, LAP, XRAY DECT, 18IN X 18IN, W/MASTER DMT, STERILE

## (undated) DEVICE — DRAPE, INSTRUMENT, W/POUCH, STERI DRAPE, 7 X 11 IN, DISPOSABLE, STERILE

## (undated) DEVICE — RESERVOIR, DRAINAGE, WOUND, JACKSON-PRATT, 100 CC, SILICONE

## (undated) DEVICE — LUBRICANT, SURGILUBE, STERILE, 2OZ

## (undated) DEVICE — DRAPE, LEGGINGS, 48 X 31 IN, STERILE, LF

## (undated) DEVICE — INTRODUCER, SUPRAPUBIC, ONE-STEP, 18FR

## (undated) DEVICE — SUPPORTER, ATHLETIC, ADULT, LARGE

## (undated) DEVICE — TUBING, CLEAR N-COND, 5MM X 10, LF

## (undated) DEVICE — COUNTER, NEEDLE, FOAM BLOCK, W/MAGNET, 20 COUNT

## (undated) DEVICE — CORD, CAUTERY, BIOPOLAR FORCEP, 12FT

## (undated) DEVICE — WOUND SYSTEM, DEBRIDEMENT & CLEANING, O.R DUOPAK

## (undated) DEVICE — DRAIN, CHANNEL, ROUND, FULL FLUTE 19F

## (undated) DEVICE — DRESSING, GAUZE, SUPER FLUFF, 7.75 X 8.75 IN, STERILE

## (undated) DEVICE — CATHETER, URETHRAL, FOLEY, 2 WAY, BARDEX IC, 16 FR, 5 CC, SILICONE

## (undated) DEVICE — IRRIGATION SET, CYSTOSCOPY, REGULATING CLAMP, STRAIGHT, 81 IN

## (undated) DEVICE — MAT, AIR TRANSFER, 39X81

## (undated) DEVICE — CATHETER, URETHRAL, FOLEY, 2 WAY, BARDEX IC, 12 FR, 5 CC, SILICONE

## (undated) DEVICE — APPLICATOR, CHLORAPREP, W/ORANGE TINT, 26ML

## (undated) DEVICE — STAY SET, SURGICAL, 5MM SHARP HOOK, 8PK

## (undated) DEVICE — SYRINGE, 10 CC, LUER LOCK

## (undated) DEVICE — DRAPE, UNDERBUTTOCKS